# Patient Record
Sex: MALE | Race: WHITE
[De-identification: names, ages, dates, MRNs, and addresses within clinical notes are randomized per-mention and may not be internally consistent; named-entity substitution may affect disease eponyms.]

---

## 2017-04-18 ENCOUNTER — HOSPITAL ENCOUNTER (OUTPATIENT)
Dept: HOSPITAL 17 - HROP | Age: 52
Discharge: HOME | End: 2017-04-18
Payer: COMMERCIAL

## 2017-04-18 VITALS — WEIGHT: 144.4 LBS | HEIGHT: 70 IN | BODY MASS INDEX: 20.67 KG/M2

## 2017-04-18 VITALS
HEART RATE: 65 BPM | SYSTOLIC BLOOD PRESSURE: 154 MMHG | RESPIRATION RATE: 20 BRPM | TEMPERATURE: 97.5 F | DIASTOLIC BLOOD PRESSURE: 80 MMHG | OXYGEN SATURATION: 98 %

## 2017-04-18 DIAGNOSIS — I10: ICD-10-CM

## 2017-04-18 DIAGNOSIS — C25.9: Primary | ICD-10-CM

## 2017-04-18 LAB
APTT BLD: 33.1 SEC (ref 24.3–30.1)
INR PPP: 1.1 RATIO
PROTHROMBIN TIME: 12.4 SEC (ref 9.8–11.6)

## 2017-04-18 PROCEDURE — G0463 HOSPITAL OUTPT CLINIC VISIT: HCPCS

## 2017-04-18 PROCEDURE — 85730 THROMBOPLASTIN TIME PARTIAL: CPT

## 2017-04-18 PROCEDURE — 99211 OFF/OP EST MAY X REQ PHY/QHP: CPT

## 2017-04-18 PROCEDURE — 85610 PROTHROMBIN TIME: CPT

## 2017-12-18 ENCOUNTER — HOSPITAL ENCOUNTER (OUTPATIENT)
Dept: HOSPITAL 17 - HRAD | Age: 52
Discharge: HOME | End: 2017-12-18
Attending: INTERNAL MEDICINE
Payer: COMMERCIAL

## 2017-12-18 VITALS
HEART RATE: 60 BPM | OXYGEN SATURATION: 99 % | DIASTOLIC BLOOD PRESSURE: 82 MMHG | TEMPERATURE: 97.8 F | SYSTOLIC BLOOD PRESSURE: 144 MMHG | RESPIRATION RATE: 18 BRPM

## 2017-12-18 VITALS
HEART RATE: 69 BPM | TEMPERATURE: 97.7 F | RESPIRATION RATE: 14 BRPM | SYSTOLIC BLOOD PRESSURE: 180 MMHG | OXYGEN SATURATION: 98 % | DIASTOLIC BLOOD PRESSURE: 98 MMHG

## 2017-12-18 VITALS
OXYGEN SATURATION: 99 % | HEART RATE: 64 BPM | DIASTOLIC BLOOD PRESSURE: 82 MMHG | RESPIRATION RATE: 17 BRPM | SYSTOLIC BLOOD PRESSURE: 156 MMHG

## 2017-12-18 DIAGNOSIS — R18.8: Primary | ICD-10-CM

## 2017-12-18 PROCEDURE — 49083 ABD PARACENTESIS W/IMAGING: CPT

## 2017-12-18 PROCEDURE — C1729 CATH, DRAINAGE: HCPCS

## 2018-01-02 ENCOUNTER — HOSPITAL ENCOUNTER (INPATIENT)
Dept: HOSPITAL 17 - NEPC | Age: 53
LOS: 4 days | Discharge: HOSPICE | DRG: 442 | End: 2018-01-06
Attending: HOSPITALIST | Admitting: HOSPITALIST
Payer: MEDICAID

## 2018-01-02 VITALS
DIASTOLIC BLOOD PRESSURE: 92 MMHG | TEMPERATURE: 97.7 F | OXYGEN SATURATION: 95 % | HEART RATE: 102 BPM | RESPIRATION RATE: 18 BRPM | SYSTOLIC BLOOD PRESSURE: 171 MMHG

## 2018-01-02 VITALS
DIASTOLIC BLOOD PRESSURE: 98 MMHG | RESPIRATION RATE: 24 BRPM | OXYGEN SATURATION: 99 % | SYSTOLIC BLOOD PRESSURE: 182 MMHG | HEART RATE: 108 BPM

## 2018-01-02 VITALS — WEIGHT: 145.51 LBS | HEIGHT: 68 IN | BODY MASS INDEX: 22.05 KG/M2

## 2018-01-02 VITALS — HEART RATE: 103 BPM

## 2018-01-02 VITALS — SYSTOLIC BLOOD PRESSURE: 175 MMHG | DIASTOLIC BLOOD PRESSURE: 90 MMHG

## 2018-01-02 VITALS — HEART RATE: 106 BPM | RESPIRATION RATE: 22 BRPM | OXYGEN SATURATION: 98 %

## 2018-01-02 VITALS
HEART RATE: 124 BPM | SYSTOLIC BLOOD PRESSURE: 179 MMHG | OXYGEN SATURATION: 95 % | TEMPERATURE: 97.9 F | DIASTOLIC BLOOD PRESSURE: 100 MMHG | RESPIRATION RATE: 18 BRPM

## 2018-01-02 VITALS
OXYGEN SATURATION: 94 % | HEART RATE: 108 BPM | DIASTOLIC BLOOD PRESSURE: 83 MMHG | SYSTOLIC BLOOD PRESSURE: 148 MMHG | TEMPERATURE: 96 F | RESPIRATION RATE: 14 BRPM

## 2018-01-02 VITALS
SYSTOLIC BLOOD PRESSURE: 175 MMHG | OXYGEN SATURATION: 97 % | HEART RATE: 109 BPM | RESPIRATION RATE: 24 BRPM | DIASTOLIC BLOOD PRESSURE: 88 MMHG

## 2018-01-02 VITALS
HEART RATE: 109 BPM | RESPIRATION RATE: 18 BRPM | TEMPERATURE: 96.4 F | SYSTOLIC BLOOD PRESSURE: 171 MMHG | DIASTOLIC BLOOD PRESSURE: 93 MMHG | OXYGEN SATURATION: 97 %

## 2018-01-02 VITALS
DIASTOLIC BLOOD PRESSURE: 92 MMHG | TEMPERATURE: 97.4 F | SYSTOLIC BLOOD PRESSURE: 179 MMHG | OXYGEN SATURATION: 95 % | HEART RATE: 111 BPM | RESPIRATION RATE: 18 BRPM

## 2018-01-02 VITALS
HEART RATE: 103 BPM | RESPIRATION RATE: 16 BRPM | SYSTOLIC BLOOD PRESSURE: 151 MMHG | TEMPERATURE: 97.1 F | DIASTOLIC BLOOD PRESSURE: 72 MMHG | OXYGEN SATURATION: 95 %

## 2018-01-02 VITALS
DIASTOLIC BLOOD PRESSURE: 95 MMHG | OXYGEN SATURATION: 96 % | SYSTOLIC BLOOD PRESSURE: 179 MMHG | RESPIRATION RATE: 13 BRPM | HEART RATE: 99 BPM

## 2018-01-02 VITALS
OXYGEN SATURATION: 96 % | RESPIRATION RATE: 20 BRPM | DIASTOLIC BLOOD PRESSURE: 77 MMHG | HEART RATE: 96 BPM | SYSTOLIC BLOOD PRESSURE: 151 MMHG

## 2018-01-02 VITALS — HEART RATE: 108 BPM

## 2018-01-02 DIAGNOSIS — G89.29: ICD-10-CM

## 2018-01-02 DIAGNOSIS — E87.2: ICD-10-CM

## 2018-01-02 DIAGNOSIS — K74.60: ICD-10-CM

## 2018-01-02 DIAGNOSIS — C79.51: ICD-10-CM

## 2018-01-02 DIAGNOSIS — R18.8: ICD-10-CM

## 2018-01-02 DIAGNOSIS — C25.9: ICD-10-CM

## 2018-01-02 DIAGNOSIS — Z66: ICD-10-CM

## 2018-01-02 DIAGNOSIS — R78.81: ICD-10-CM

## 2018-01-02 DIAGNOSIS — Z87.891: ICD-10-CM

## 2018-01-02 DIAGNOSIS — I10: ICD-10-CM

## 2018-01-02 DIAGNOSIS — E46: ICD-10-CM

## 2018-01-02 DIAGNOSIS — R00.0: ICD-10-CM

## 2018-01-02 DIAGNOSIS — C78.7: ICD-10-CM

## 2018-01-02 DIAGNOSIS — D68.4: ICD-10-CM

## 2018-01-02 DIAGNOSIS — J44.9: ICD-10-CM

## 2018-01-02 DIAGNOSIS — K72.90: Primary | ICD-10-CM

## 2018-01-02 DIAGNOSIS — R64: ICD-10-CM

## 2018-01-02 LAB
ALBUMIN SERPL-MCNC: 2.1 GM/DL (ref 3.4–5)
ALP SERPL-CCNC: 184 U/L (ref 45–117)
ALT SERPL-CCNC: 47 U/L (ref 12–78)
AMORPHOUS SEDIMENT, URINE: (no result)
AST SERPL-CCNC: 91 U/L (ref 15–37)
BACTERIA #/AREA URNS HPF: (no result) /HPF
BASO STIPL BLD QL SMEAR: (no result)
BASOPHILS # BLD AUTO: 0 TH/MM3 (ref 0–0.2)
BASOPHILS NFR BLD: 0.3 % (ref 0–2)
BILIRUB INDIRECT SERPL-MCNC: 6.2 MG/DL (ref 0–0.8)
BILIRUB SERPL-MCNC: 20.4 MG/DL (ref 0.2–1)
BILIRUB SERPL-MCNC: 21.5 MG/DL (ref 0.2–1)
BUN SERPL-MCNC: 37 MG/DL (ref 7–18)
CALCIUM SERPL-MCNC: 8 MG/DL (ref 8.5–10.1)
CHLORIDE SERPL-SCNC: 99 MEQ/L (ref 98–107)
COLOR UR: (no result)
CREAT SERPL-MCNC: 1.23 MG/DL (ref 0.6–1.3)
DIRECT BILIRUBIN ADULT: 14.2 MG/DL (ref 0–0.2)
EOSINOPHIL # BLD: 0 TH/MM3 (ref 0–0.4)
EOSINOPHIL NFR BLD: 0.6 % (ref 0–4)
ERYTHROCYTE [DISTWIDTH] IN BLOOD BY AUTOMATED COUNT: 17.4 % (ref 11.6–17.2)
ERYTHROCYTE [DISTWIDTH] IN BLOOD BY AUTOMATED COUNT: 17.8 % (ref 11.6–17.2)
GFR SERPLBLD BASED ON 1.73 SQ M-ARVRAT: 62 ML/MIN (ref 89–?)
GLUCOSE SERPL-MCNC: 101 MG/DL (ref 74–106)
GLUCOSE UR STRIP-MCNC: (no result) MG/DL
HCO3 BLD-SCNC: 25.9 MEQ/L (ref 21–32)
HCT VFR BLD CALC: 35.8 % (ref 39–51)
HCT VFR BLD CALC: 36.7 % (ref 39–51)
HGB BLD-MCNC: 12.9 GM/DL (ref 13–17)
HGB BLD-MCNC: 13.1 GM/DL (ref 13–17)
HGB UR QL STRIP: (no result)
HYALINE CASTS #/AREA URNS LPF: 6 /LPF
INR PPP: 3.9 RATIO
INR PPP: 4.2 RATIO
KETONES UR STRIP-MCNC: 10 MG/DL
LACTIC ACID SEPSIS PROTOCOL: 3.8 MMOL/L (ref 0.4–2)
LDH SERPL-CCNC: 271 U/L (ref 87–241)
LIPASE: 46 U/L (ref 73–393)
LYMPHOCYTES # BLD AUTO: 0.9 TH/MM3 (ref 1–4.8)
LYMPHOCYTES NFR BLD AUTO: 11.7 % (ref 9–44)
LYMPHOCYTES: 12 % (ref 9–44)
MCH RBC QN AUTO: 34.4 PG (ref 27–34)
MCH RBC QN AUTO: 34.6 PG (ref 27–34)
MCHC RBC AUTO-ENTMCNC: 35.6 % (ref 32–36)
MCHC RBC AUTO-ENTMCNC: 36 % (ref 32–36)
MCV RBC AUTO: 96 FL (ref 80–100)
MCV RBC AUTO: 96.7 FL (ref 80–100)
MONOCYTE #: 0.6 TH/MM3 (ref 0–0.9)
MONOCYTES NFR BLD: 7.1 % (ref 0–8)
MONOCYTES: 3 % (ref 0–8)
MUCOUS THREADS #/AREA URNS LPF: (no result) /LPF
NEUTROPHILS # BLD AUTO: 6.2 TH/MM3 (ref 1.8–7.7)
NEUTROPHILS NFR BLD AUTO: 80.3 % (ref 16–70)
NEUTS BAND # BLD MANUAL: 6.6 TH/MM3 (ref 1.8–7.7)
NEUTS BAND NFR BLD: 18 % (ref 0–6)
NEUTS SEG NFR BLD MANUAL: 66 % (ref 16–70)
NITRITE UR QL STRIP: (no result)
PLATELET # BLD: 140 TH/MM3 (ref 150–450)
PLATELET # BLD: 83 TH/MM3 (ref 150–450)
PMV BLD AUTO: 7.7 FL (ref 7–11)
PMV BLD AUTO: 8.2 FL (ref 7–11)
PROT SERPL-MCNC: 7.1 GM/DL (ref 6.4–8.2)
PROT SERPL-MCNC: 7.4 GM/DL (ref 6.4–8.2)
PROTHROMBIN TIME: 39.5 SEC (ref 9.8–11.6)
PROTHROMBIN TIME: 42.2 SEC (ref 9.8–11.6)
RBC # BLD AUTO: 3.73 MIL/MM3 (ref 4.5–5.9)
RBC # BLD AUTO: 3.8 MIL/MM3 (ref 4.5–5.9)
SODIUM SERPL-SCNC: 134 MEQ/L (ref 136–145)
SP GR UR STRIP: 1.02 (ref 1–1.03)
URINE LEUKOCYTE ESTERASE: (no result)
WBC # BLD AUTO: 12.4 TH/MM3 (ref 4–11)
WBC # BLD AUTO: 7.8 TH/MM3 (ref 4–11)
WBC TOXIC VACUOLES BLD QL SMEAR: PRESENT

## 2018-01-02 PROCEDURE — 80048 BASIC METABOLIC PNL TOTAL CA: CPT

## 2018-01-02 PROCEDURE — 85007 BL SMEAR W/DIFF WBC COUNT: CPT

## 2018-01-02 PROCEDURE — 96365 THER/PROPH/DIAG IV INF INIT: CPT

## 2018-01-02 PROCEDURE — 84155 ASSAY OF PROTEIN SERUM: CPT

## 2018-01-02 PROCEDURE — 87186 SC STD MICRODIL/AGAR DIL: CPT

## 2018-01-02 PROCEDURE — 70450 CT HEAD/BRAIN W/O DYE: CPT

## 2018-01-02 PROCEDURE — 96368 THER/DIAG CONCURRENT INF: CPT

## 2018-01-02 PROCEDURE — 82948 REAGENT STRIP/BLOOD GLUCOSE: CPT

## 2018-01-02 PROCEDURE — 83690 ASSAY OF LIPASE: CPT

## 2018-01-02 PROCEDURE — 81001 URINALYSIS AUTO W/SCOPE: CPT

## 2018-01-02 PROCEDURE — 80202 ASSAY OF VANCOMYCIN: CPT

## 2018-01-02 PROCEDURE — 87077 CULTURE AEROBIC IDENTIFY: CPT

## 2018-01-02 PROCEDURE — 84100 ASSAY OF PHOSPHORUS: CPT

## 2018-01-02 PROCEDURE — 83615 LACTATE (LD) (LDH) ENZYME: CPT

## 2018-01-02 PROCEDURE — 84439 ASSAY OF FREE THYROXINE: CPT

## 2018-01-02 PROCEDURE — 85335 FACTOR INHIBITOR TEST: CPT

## 2018-01-02 PROCEDURE — 85610 PROTHROMBIN TIME: CPT

## 2018-01-02 PROCEDURE — 74177 CT ABD & PELVIS W/CONTRAST: CPT

## 2018-01-02 PROCEDURE — C9113 INJ PANTOPRAZOLE SODIUM, VIA: HCPCS

## 2018-01-02 PROCEDURE — 83735 ASSAY OF MAGNESIUM: CPT

## 2018-01-02 PROCEDURE — 87205 SMEAR GRAM STAIN: CPT

## 2018-01-02 PROCEDURE — 82150 ASSAY OF AMYLASE: CPT

## 2018-01-02 PROCEDURE — 80076 HEPATIC FUNCTION PANEL: CPT

## 2018-01-02 PROCEDURE — 85027 COMPLETE CBC AUTOMATED: CPT

## 2018-01-02 PROCEDURE — 83036 HEMOGLOBIN GLYCOSYLATED A1C: CPT

## 2018-01-02 PROCEDURE — 82247 BILIRUBIN TOTAL: CPT

## 2018-01-02 PROCEDURE — 80053 COMPREHEN METABOLIC PANEL: CPT

## 2018-01-02 PROCEDURE — 82140 ASSAY OF AMMONIA: CPT

## 2018-01-02 PROCEDURE — 82248 BILIRUBIN DIRECT: CPT

## 2018-01-02 PROCEDURE — 76705 ECHO EXAM OF ABDOMEN: CPT

## 2018-01-02 PROCEDURE — 85025 COMPLETE CBC W/AUTO DIFF WBC: CPT

## 2018-01-02 PROCEDURE — 83605 ASSAY OF LACTIC ACID: CPT

## 2018-01-02 PROCEDURE — 71045 X-RAY EXAM CHEST 1 VIEW: CPT

## 2018-01-02 PROCEDURE — 85730 THROMBOPLASTIN TIME PARTIAL: CPT

## 2018-01-02 PROCEDURE — 84443 ASSAY THYROID STIM HORMONE: CPT

## 2018-01-02 PROCEDURE — 87040 BLOOD CULTURE FOR BACTERIA: CPT

## 2018-01-02 RX ADMIN — WATER SCH ML: 1 IRRIGANT IRRIGATION at 21:17

## 2018-01-02 RX ADMIN — TAZOBACTAM SODIUM AND PIPERACILLIN SODIUM SCH MLS/HR: 375; 3 INJECTION, SOLUTION INTRAVENOUS at 23:49

## 2018-01-02 RX ADMIN — PANTOPRAZOLE SODIUM SCH MG: 40 INJECTION, POWDER, FOR SOLUTION INTRAVENOUS at 16:36

## 2018-01-02 RX ADMIN — SODIUM BICARBONATE SCH MLS/HR: 84 INJECTION, SOLUTION INTRAVENOUS at 21:25

## 2018-01-02 RX ADMIN — Medication SCH ML: at 08:19

## 2018-01-02 RX ADMIN — SODIUM CHLORIDE SCH MLS/HR: 900 INJECTION INTRAVENOUS at 15:19

## 2018-01-02 RX ADMIN — STANDARDIZED SENNA CONCENTRATE AND DOCUSATE SODIUM SCH TAB: 8.6; 5 TABLET, FILM COATED ORAL at 21:00

## 2018-01-02 RX ADMIN — WATER SCH ML: 1 IRRIGANT IRRIGATION at 18:14

## 2018-01-02 RX ADMIN — RIFAXIMIN SCH MG: 550 TABLET ORAL at 09:51

## 2018-01-02 RX ADMIN — TAZOBACTAM SODIUM AND PIPERACILLIN SODIUM SCH MLS/HR: 375; 3 INJECTION, SOLUTION INTRAVENOUS at 11:25

## 2018-01-02 RX ADMIN — WATER SCH ML: 1 IRRIGANT IRRIGATION at 09:50

## 2018-01-02 RX ADMIN — TAZOBACTAM SODIUM AND PIPERACILLIN SODIUM SCH MLS/HR: 375; 3 INJECTION, SOLUTION INTRAVENOUS at 16:36

## 2018-01-02 RX ADMIN — Medication SCH ML: at 19:47

## 2018-01-02 RX ADMIN — WATER SCH ML: 1 IRRIGANT IRRIGATION at 14:00

## 2018-01-02 RX ADMIN — STANDARDIZED SENNA CONCENTRATE AND DOCUSATE SODIUM SCH TAB: 8.6; 5 TABLET, FILM COATED ORAL at 09:00

## 2018-01-02 RX ADMIN — RIFAXIMIN SCH MG: 550 TABLET ORAL at 21:17

## 2018-01-02 NOTE — HHI.HP
__________________________________________________





HPI


Service


[facility]


Primary Care Physician


No Primary Care Physician


Admission Diagnosis





jaundice.  Hepatic encephalopathy.  History of pancreatic cancer.


Diagnoses:  


Travel History


International Travel<30 Days:  No


Contact w/Intl Traveler <30 Da:  No


Traveled to Known Affected Are:  No


History of Present Illness


52-year-old male with a past medical history significant for pancreatic cancer 

and hypertension presents to the emergency department with altered mental 

status that began yesterday.  The patient's wife reports that he has had 

intermittent coughing and vomiting for the past 2 days.  She reports that 

yesterday he became altered describing him as confused and disoriented.  The 

patient did not know where he was when he was in his own home.  She also 

reports noticing increased scleral icterus and jaundice that began on .  

Vision is currently undergoing chemotherapy.  His oncologist is Dr. Alves.


On arrival to the emergency department the patient was tachycardic at 124.  He 

was afebrile at 97.9.  /100.  Pulse ox 95% on room air.  Lab values 

significant for a total bilirubin of 21.5 (was 1.8 on ).  Ammonia 91.  

Lactic acid 6.3.  CBC pending.  Head CT negative for acute intracranial 

process.  The patient is altered, A&O 0.





Review of Systems


Unable to obtain secondary to patient's clinical condition





Past Family Social History


Past Medical History


Hypertension


Pancreatic cancer diagnosed 15


Past Surgical History


Vasectomy


Port placement in 


CT-guided biopsy in 


Percutaneous drain in 


Reported Medications





Reported Meds & Active Scripts


Active


Reported


Amlodipine (Amlodipine Besylate) 5 Mg Tab 5 Mg PO DAILY


Allergies:  


Coded Allergies:  


     No Known Allergies (Unverified , 16)


Family History


Negative for CAD/DM


Social History


Per wife, the patient does not drink alcohol, smoke cigarettes or take illicit 

drugs





Physical Exam


Vital Signs





Vital Signs








  Date Time  Temp Pulse Resp B/P (MAP) Pulse Ox O2 Delivery O2 Flow Rate FiO2


 


18 04:35  96 20 151/77 (101) 96 Room Air  


 


18 03:38  109 24 175/88 (117) 97 Room Air  


 


18 03:27  106 22  98 Room Air  


 


18 02:55  108 24 182/98 (126) 99 Room Air  


 


18 02:38 97.9 124 18 179/100 (126) 95 Room Air  








Physical Exam


GENERAL: Cachectic  male lying in bed


SKIN: Jaundice, + scleral icterus


HEAD: Atraumatic. Normocephalic. No temporal or scalp tenderness.


EYES: Pupils equal round and reactive. Extraocular motions intact. 


ENT: Nose without bleeding, purulent drainage or septal hematoma. Throat 

without erythema, tonsillar hypertrophy or exudate. Uvula midline. Airway 

patent.


NECK: Trachea midline. No JVD or lymphadenopathy. 


CARDIOVASCULAR: Regular rate and rhythm without murmurs, gallops, or rubs. 


RESPIRATORY: Clear to auscultation. Breath sounds equal bilaterally. No wheezes

, rales, or rhonchi.  


GASTROINTESTINAL: Abdomen soft, non-tender, + ascites. No guarding.


MUSCULOSKELETAL: 2+ pitting edema to the midshin


NEUROLOGICAL: Patient babbling incoherently.  Unable to answer questions.  

Moves all 4 extremities spontaneously.


Laboratory





Laboratory Tests








Test


  18


03:15 18


05:15


 


Prothrombin Time 39.5  


 


Prothromb Time International


Ratio 3.9 


  


 


 


Activated Partial


Thromboplast Time 50.1 


  


 


 


Blood Urea Nitrogen 37  


 


Creatinine 1.23  


 


Random Glucose 101  


 


Total Protein 7.4  


 


Albumin 2.1  


 


Calcium Level 8.0  


 


Alkaline Phosphatase 184  


 


Aspartate Amino Transf


(AST/SGOT) 91 


  


 


 


Alanine Aminotransferase


(ALT/SGPT) 47 


  


 


 


Total Bilirubin 21.5  


 


Sodium Level 134  


 


Potassium Level 4.0  


 


Chloride Level 99  


 


Carbon Dioxide Level 25.9  


 


Anion Gap 9  


 


Estimat Glomerular Filtration


Rate 62 


  


 


 


Lactic Acid Level 6.3  


 


Ammonia 91  


 


Lipase 46  














 Date/Time


Source Procedure


Growth Status


 


 


 18 03:15


Blood Peripheral Aerobic Blood Culture


Pending Received


 


 18 03:15


Blood Peripheral Anaerobic Blood Culture


Pending Received








Result Diagram:  


18 0315








Caprini VTE Risk Assessment


Caprini VTE Risk Assessment:  Mod/High Risk (score >= 2)


Caprini Risk Assessment Model











 Point Value = 1          Point Value = 2  Point Value = 3  Point Value = 5


 


Age 41-60


Minor surgery


BMI > 25 kg/m2


Swollen legs


Varicose veins


Pregnancy or postpartum


History of unexplained or recurrent


   spontaneous 


Oral contraceptives or hormone


   replacement


Sepsis (< 1 month)


Serious lung disease, including


   pneumonia (< 1 month)


Abnormal pulmonary function


Acute myocardial infarction


Congestive heart failure (< 1 month)


History of inflammatory bowel disease


Medical patient at bed rest Age 61-74


Arthroscopic surgery


Major open surgery (> 45 min)


Laparoscopic surgery (> 45 min)


Malignancy


Confined to bed (> 72 hours)


Immobilizing plaster cast


Central venous access Age >= 75


History of VTE


Family history of VTE


Factor V Leiden


Prothrombin 33998Z


Lupus anticoagulant


Anticardiolipin antibodies


Elevated serum homocysteine


Heparin-induced thrombocytopenia


Other congenital or acquired


   thrombophilia Stroke (< 1 month)


Elective arthroplasty


Hip, pelvis, or leg fracture


Acute spinal cord injury (< 1 month)








Prophylaxis Regimen











   Total Risk


Factor Score Risk Level Prophylaxis Regimen


 


0-1      Low Early ambulation


 


2 Moderate Order ONE of the following:


*Sequential Compression Device (SCD)


*Heparin 5000 units SQ BID


 


3-4 Higher Order ONE of the following medications:


*Heparin 5000 units SQ TID


*Enoxaparin/Lovenox 40 mg SQ daily (WT < 150 kg, CrCl > 30 mL/min)


*Enoxaparin/Lovenox 30 mg SQ daily (WT < 150 kg, CrCl > 10-29 mL/min)


*Enoxaparin/Lovenox 30 mg SQ BID (WT < 150 kg, CrCl > 30 mL/min)


AND/OR


*Sequential Compression Device (SCD)


 


5 or more Highest Order ONE of the following medications:


*Heparin 5000 units SQ TID (Preferred with Epidurals)


*Enoxaparin/Lovenox 40 mg SQ daily (WT < 150 kg, CrCl > 30 mL/min)


*Enoxaparin/Lovenox 30 mg SQ daily (WT < 150 kg, CrCl > 10-29 mL/min)


*Enoxaparin/Lovenox 30 mg SQ BID (WT < 150 kg, CrCl > 30 mL/min)


AND


*Sequential Compression Device (SCD)











Assessment and Plan


Assessment and Plan


Assessment/plan:





1.  Pancreatic cancer/AMS/Jaundice/bilirubinemia/hepatic encephalopathy


T bili 21.5, ammonia 91, AST/ALT 91/47


Patient with history of pancreatic duct stent, concern for blockage.  

Ultrasound pending for further evaluation.


Consult gastroenterology, medical oncology - appreciate recommendations


Lactulose for elevated ammonia





2.  Elevated lactic acid


Suspect secondary to above


Chest x-ray without infiltrates


CBC pending


UA pending


Vancomycin/Zosyn until infectious rule out completed


Holding IV fluid hydration secondary to ascites and low albumin





3.  Ascites


Chronic


Last paracentesis on 


Continue Lasix





4.  Hypertension


Continue amlodipine





FEN


NPO


Electrolytes: monitor and replete prn


SCDs


Holding pharmacologic anticoagulation as patient may require procedure





Physician Certification


2 Midnight Certification Type:  Admission for Inpatient Services


Order for Inpatient Services


The services are ordered in accordance with Medicare regulations or non-

Medicare payer requirements, as applicable.  In the case of services not 

specified as inpatient-only, they are appropriately provided as inpatient 

services in accordance with the 2-midnight benchmark.


Estimated LOS (days):  2


2 days is the estimated time the patient will need to remain in the hospital, 

assuming treatment plan goals are met and no additional complications.


Post-Hospital Plan:  Not yet determined











Sharon Hernandez MD 2018 05:59

## 2018-01-02 NOTE — PD
HPI


Chief Complaint:  Altered Mental Status


Time Seen by Provider:  02:49


Travel History


International Travel<30 days:  No


Contact w/Intl Traveler<30days:  No


Traveled to known affect area:  No





History of Present Illness


HPI


52-year-old male was brought in by family member for mental confusion, 

increased generalized malaise and weakness, decrease in appetite, intermittent 

nausea vomiting for the past 2 days.  Patient has history of pancreatic cancer.

  Patient was on Folfirinox hemotherapy.  Patient was unable to tolerate  more 

chemotherapy due to cytopenia.  Chemotherapy was changed to Keytruda.  Patient 

had one cycle of the children 2 weeks ago.  Family member states the patient 

has increasing delirium, confusion, decreased appetite with nausea vomiting for 

the past 2 days.  Patient has history of chronic abdominal pain with ascites 

that the post paracentesis in the past.  Patient has history hypertension and 

was on amlodipine and past.  Patient has not taken his amlodipine since 

October.  Patient has been taking Lasix 20 mg as needed for swelling and 

ascites.  Patient had chronic abdominal pain with ascites.  Patient has history 

of COPD, metal stent placement biliary duct, hypertension.  Family member 

reported no fever at home.  Family members reported no coughing congestion.  

Family member reported increasing jaundice of the skin in the eyes for the past 

2 days.





PFSH


Past Medical History


Heart Rhythm Problems:  No


Cancer:  Yes (PANCREATIC )


Cardiovascular Problems:  Yes (HTN)


High Cholesterol:  No


Chemotherapy:  Yes


Chest Pain:  No


Congestive Heart Failure:  No


Endocrine:  No


Gastrointestinal Disorders:  No


Genitourinary:  No


Heparin Induced Thrombocytopen:  No


Hypertension:  Yes


Immune Disorder:  No


Implanted Vascular Access Dvce:  No


Musculoskeletal:  No


Neurologic:  No


Psychiatric:  No


Reproductive:  No


Respiratory:  No


Immunizations Current:  No


Radiation Therapy:  No


Sickle Cell Disease:  No





Past Surgical History


Thoracic Surgery:  Yes (POT PLACEMENT)


Other Surgery:  No





Social History


Alcohol Use:  No (6-12 beer/day for the past 30 years)


Tobacco Use:  No


Substance Use:  No





Allergies-Medications


(Allergen,Severity, Reaction):  


Coded Allergies:  


     No Known Allergies (Unverified , 8/17/16)


Reported Meds & Prescriptions





Reported Meds & Active Scripts


Active


Reported


Amlodipine (Amlodipine Besylate) 5 Mg Tab 5 Mg PO DAILY








Review of Systems


General / Constitutional:  No: Fever


Eyes:  No: Visual changes


HENT:  No: Headaches


Cardiovascular:  No: Chest Pain or Discomfort


Respiratory:  No: Shortness of Breath


Gastrointestinal:  Positive: Nausea, Vomiting, No: Abdominal Pain


Genitourinary:  No: Dysuria


Musculoskeletal:  No: Pain


Skin:  No Rash


Neurologic:  No: Weakness


Psychiatric:  No: Depression


Endocrine:  No: Polydipsia


Hematologic/Lymphatic:  No: Easy Bruising





Physical Exam


Narrative


GENERAL: Thin male, lying in bed, minimal vocalization, no acute distress.


SKIN: Focused skin assessment warm/dry.  The skin is jaundice.


HEAD: Normocephalic.


EYES: Bilateral scleral icterus. No injection or drainage. 


NECK: Supple, trachea midline. No JVD or lymphadenopathy.


CARDIOVASCULAR: Regular rate and rhythm without murmurs, gallops, or rubs. 


RESPIRATORY: Breath sounds equal bilaterally. No accessory muscle use.


GASTROINTESTINAL: Abdomen soft, non-tender, nondistended. 


MUSCULOSKELETAL: No cyanosis, or edema. 


BACK: Nontender without obvious deformity. No CVA tenderness. 


Neurologic exam: Patient's awake and alert however minimal globalization.  

Patient moves all extremity well.  No obvious focal neurological deficit.





Data


Data


Last Documented VS





Vital Signs








  Date Time  Temp Pulse Resp B/P (MAP) Pulse Ox O2 Delivery O2 Flow Rate FiO2


 


1/2/18 04:35  96 20 151/77 (101) 96 Room Air  


 


1/2/18 02:38 97.9       








Orders





 Orders


Complete Blood Count With Diff (1/2/18 03:01)


Comprehensive Metabolic Panel (1/2/18 03:01)


Prothrombin Time / Inr (Pt) (1/2/18 03:01)


Act Partial Throm Time (Ptt) (1/2/18 03:01)


Blood Culture (1/2/18 03:01)


Lipase (1/2/18 03:01)


Urinalysis - C+S If Indicated (1/2/18 03:01)


Ammonia (1/2/18 03:01)


Chest, Single Ap (1/2/18 03:01)


Ct Brain W/O Iv Contrast(Rout) (1/2/18 03:01)


Iv Access Insert/Monitor (1/2/18 03:01)


Ecg Monitoring (1/2/18 03:01)


Oximetry (1/2/18 03:01)


Lactic Acid (1/2/18 03:01)


Sodium Chlor 0.9% 1000 Ml Inj (Ns 1000 M (1/2/18 04:30)


Vancomycin Inj (Vancomycin Inj) (1/2/18 04:30)


Piperacil-Tazo 3.375 Gm Premix (Zosyn 3. (1/2/18 04:30)


Us Abdomen Gallbladder (1/2/18 04:56)





Labs





Laboratory Tests








Test


  1/2/18


03:15 1/2/18


04:20


 


Prothrombin Time 39.5 SEC  


 


Prothromb Time International


Ratio 3.9 RATIO 


  


 


 


Activated Partial


Thromboplast Time 50.1 SEC 


  


 


 


Blood Urea Nitrogen 37 MG/DL  


 


Creatinine 1.23 MG/DL  


 


Random Glucose 101 MG/DL  


 


Total Protein 7.4 GM/DL  


 


Albumin 2.1 GM/DL  


 


Calcium Level 8.0 MG/DL  


 


Alkaline Phosphatase 184 U/L  


 


Aspartate Amino Transf


(AST/SGOT) 91 U/L 


  


 


 


Alanine Aminotransferase


(ALT/SGPT) 47 U/L 


  


 


 


Total Bilirubin 21.5 MG/DL  


 


Sodium Level 134 MEQ/L  


 


Potassium Level 4.0 MEQ/L  


 


Chloride Level 99 MEQ/L  


 


Carbon Dioxide Level 25.9 MEQ/L  


 


Anion Gap 9 MEQ/L  


 


Estimat Glomerular Filtration


Rate 62 ML/MIN 


  


 


 


Lactic Acid Level 6.3 mmol/L  


 


Ammonia 91 MCMOL/L  


 


Lipase 46 U/L  











MDM


Medical Decision Making


Medical Screen Exam Complete:  Yes


Emergency Medical Condition:  Yes


Interpretation(s)





Last Impressions








Head CT 1/2/18 0301 Signed





Impressions: 





 Service Date/Time:  Tuesday, January 2, 2018 03:23 - CONCLUSION:  No acute 





 disease.       Clement Torres Jr., MD 


 


Chest X-Ray 1/2/18 0301 Signed





Impressions: 





 Service Date/Time:  Tuesday, January 2, 2018 03:16 - CONCLUSION:  Blunting of 





 the right costophrenic angle I either related to a small effusion or pleural 





 scarring. Otherwise, no infiltrates.     Clement Torres Jr., MD 





4:21 AM.  Lactic acid 6.3 .  Ammonia 91.  INR 3.9.


Differential Diagnosis


Differential diagnosis including acute exacerbation of chronic condition, 

hepatitis, pancreatitis, biliary obstruction, electrolyte imbalance, dehydration

, sepsis.


Narrative Course


52-year-old male with increasing jaundice, delirium, confusion, decreased 

appetite, nausea vomiting.  History of pancreatic cancer.  Patient is on 

chemotherapy.  Normal saline solution 1 L IV bolus.  Vancomycin 1 g IV.  Zosyn 

3.375 g IV.





Diagnosis





 Primary Impression:  


 Jaundice


 Additional Impression:  


 Hepatic encephalopathy





Admitting Information


Admitting Physician Requests:  Admit











Reg Coats MD Jan 2, 2018 03:13

## 2018-01-02 NOTE — MB
cc:

ALINA URÑEA STEVEN

****

 

 

DATE OF CONSULTATION:  1/2/2018

 

YOB: 1965

 

REFERRING PHYSICIAN

Dr. Baca

 

CHIEF COMPLAINT

Dr. Baca requested consultation for Mr. Chino regarding jaundice

associated with altered mental status.

 

HISTORY OF PRESENT ILLNESS

Mr. Chino is a 52-year-old man well-known patient with metastatic

unresectable pancreatic cancer.  He has progressed on multiple lines of

chemotherapy including Abraxane and gemcitabine.  Most recently he has had

progression on Folfirinox. His tumor markers are extremely high.  His last

cycle of chemotherapy was complicated by prolonged cytopenias unable to

tolerate more cytotoxic chemotherapy.

 

Since his progression, he has developed complications of ascites status post

thoracentesis without significant alleviation of symptoms.  He had lower

extremity edema.  He was on diuretic therapy.  Recently he was approved for

Keytruda, check point inhibitor.  He was treated with Keytruda prior to his

last visit on December 20, 2017.  He tolerated the Keytruda well.  We discussed

toxicity associated with the check point inhibitor.  He was supposed to follow

up after the holidays.

 

His wife reports that he was becoming jaundice two days prior to his

presentation.  Unfortunately he did not want to come into the emergency room.

Finally he was confused and his wife was able to bring him in.  He was seen in

the emergency room by Dr. Reg Coats.  He has confusion, generalized weakness.

He had a decrease in appetite.  He has had intermittent vomiting the last two

days which was new.

 

He is pending ERCP evaluation.  Gallbladder ultrasound shows diffuse ascites.

Liver is small consistent with cirrhosis.  He has known pancreatic cancer with

hepatic metastatic disease.  He has bony metastatic disease.  He has solid wall

stent in place.  His last bilirubin on his last followup was 1.8.  His

bilirubin on admission was 21.5.  He is overtly confused.  He is nonverbal. He

seems to have some recognition and follows simple commands.

 

LABORATORY DATA:

His labs show a hemoglobin of 12.9, platelet count 140,000.  His lactic acid is

elevated.  His ammonia is 91, .  The liver functions show an AST of 91,

alkaline phosphatase 184.  His last tumor marker was up to 24,000 after the

Keytruda.

 

PAST MEDICAL HISTORY

1. Unresectable pancreatic cancer.

2. Hypertension.

3. Ascites.

4. Liver cirrhosis.

5. Pancytopenia.

6. COPD.

 

PAST SURGICAL HISTORY:

1. Vasectomy

2. Port placement.

3. CT guided biopsy.

4. Percutaneous drain.

5. ERCP.

6. Metal stent placement.

 

FAMILY HISTORY:

No family history of cancer.

 

SOCIAL HISTORY

He is , lives with his wife.  Denies any tobacco, alcohol or illicit

drug use.  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

 

ALLERGIES

NO KNOWN DRUG ALLERGIES.

 

CURRENT MEDICATIONS

1. Clonidine p.r.n.

2. Vancomycin.

3. Protonix.

4. Piperacillin tazobactam.

5. Jackie-Colace

6. Lactulose.

7. Norvasc.

8. Cytoxan.

 

PHYSICAL EXAMINATION:

VITAL SIGNS: Temperature 96.4, heart rate 109, respiratory rate 18, blood

pressure 171/93, saturation 97%.

GENERAL: Mr. Chino is a cachectic appearing man who is awake, alert,

nonverbal, follows simple commands.

HEENT: His pupils are round and reactive.  Sclerae is icteric.  He is overtly

jaundiced, bitemporal wasting.  Oropharynx is dry, poor dentition.

NECK: Supple.

LUNGS: Clear to auscultation.

CARDIOVASCULAR: Reveals tachycardia.

ABDOMEN: Distension, fluid wave is noted.  Nontender.

LOWER EXTREMITIES: No edema.  He moves all four extremities spontaneously.

 

Labs consistent with a normocytic anemia.  Hemoglobin 12.9, platelet count 140.

White blood cell count is elevated.

 

ASSESSMENT AND PLAN:

Mr. Chino is a 52 year-old man with unresectable metastatic pancreatic cancer

who has progressed on multiple lines of chemotherapy.  He is currently on a

check point inhibitor.

 

He presents with acute jaundice.  The onset seems to have been about two days.

He has had a rising bilirubin since.  His metal stent is managed by a

gastroenterologist who do not admit to [facility].

 

He is pending consultation with gastroenterology.  He appears to have an

obstruction, hopefully on his metal stent.  We are obtaining MRCP to identify a

correctable lesion.

 

I defer to gastroenterology, if a correctable lesion is identified to correct

it.  We are hopeful for any reversible process that may decrease his bilirubin.

 

 

I discussed honestly with his wife if the increase in bilirubin is due to his

liver metastatic disease, the treatment has been palliative all along.  At this

point he is not able to make decisions.

 

Palliative care has been consulted and is talking with the family.  Mrs. Chino would like to remain hopeful for the time being.

 

We discussed supportive treatment including supportive fluid.  He is clinically

dry.  He is given lorazepam for his MRCP.  He is a fall precaution.  He may

need a one-to-one sitter pending on his progress overnight.  This was discussed

with the patient's nurses.

 

He is pending diagnostic paracentesis and therapeutic paracentesis.  Vitamin K

is administered.  He may need FFP in the morning to correct the PT/PTT

prolongation.  Mixing study has been requested.

 

 and Mrs. Chino's questions were answered to their satisfaction.  Mrs. Chino was called over the phone to discuss the above.  We will confer with

gastroenterology for reversible lesion.  In the meantime supportive treatment

lactulose is in place.

 

 

 

 

                              _________________________________

                              MD LUCA Abel/ESHA

D:  1/2/2018/7:04 PM

T:  1/2/2018/7:17 PM

Visit #:  [id number]

Job #:  [id number]

## 2018-01-02 NOTE — PD.CONS
HPI


History of Present Illness


This is a unfortunate 52 year old who was diagnosed with pancreatitis and has 

been followed by Dr. Ayala oncology.  According to the record patient has 

been receiving chemotherapy.  Currently patient is unable to give any pertinent 

history data, occasional groan and grimace noted, large taut abdomen noted with 

diffuse pain with light palpation.  Currently patient is resting in the bed, 

unknown for any nausea or vomiting, but positive for cathartic stature. .


According to the record patient became altered with increased jaundice and 

scleral and icterus on 17.  Initial bilirubin on admission was 21.5, 

ammonia level 91, lactic acid level 6.3.  INR 4.2, elevated in the last 24 

hours.  Patient is currently icteric skin, gallbladder ultrasound shows diffuse 

ascites liver cirrhosis with lesions, biliary stent in place.  He is in a very 

weakened state, and appears unable to communicate.  Most of his information is 

being obtained from the record





PFSH


Past Medical History


Hypertension


Pancreatic cancer diagnosed 16


Past Surgical History


Vasectomy


Port placement in 


CT-guided biopsy in 


Percutaneous drain in 


Coded Allergies:  


     No Known Allergies (Unverified , 16)


Medications





Administered Medications








 Medications


  (Trade)  Dose


 Ordered  Sig/Amilcar


 Route


 PRN Reason  Start Time


 Stop Time Status Last Admin


Dose Admin


 


 Sodium Chloride


  (NS Flush)  2 ml  BID


 IV FLUSH


   18 09:00


    18 08:19


 


 


 Lactulose


  (Lactulose Liq)  30 ml  QID


 PO


   18 09:00


    18 14:00


 


 


 Piperacillin Sod/


 Tazobactam Sod  50 ml @ 


 100 mls/hr  Q6H


 IV


   18 11:00


    18 11:25


 


 


 Amlodipine


 Besylate


  (Norvasc)  5 mg  DAILY


 PO


   18 09:00


    18 09:51


 


 


 Furosemide


  (Lasix)  20 mg  DAILY


 PO


   18 09:00


    18 09:50


 


 


 Rifaximin


  (Xifaxan)  550 mg  BID


 PO


   18 09:00


    18 09:51


 


 


 Vancomycin HCl


 1250 mg/Sodium


 Chloride  262.5 ml @ 


 250 mls/hr  Q18H


 IV


   18 16:00


    18 15:19


 








Last Impressions








Gall Bladder Ultrasound 18 0456 Signed





Impressions: 





 Service Date/Time:  2018 07:54 - CONCLUSION:  1. There is 





 diffuse abdominal ascites. 2. The liver appears quite small and heterogeneous 

in 





 echotexture consistent with cirrhosis. The patient has known pancreatic cancer 





 with several small hepatic lesions. These were not seen by the ultrasound. I 

do 





 not see evidence of intrahepatic biliary ductal dilation. The patient has a 





 known biliary stent in place. 3. There is minimal sludge in the dependent 





 portion the gallbladder. There is gallbladder wall thickening however, this is 





 not an unexpected finding with the presence of ascites.     Giorgi Soto MD 


 


Head CT 18 Signed





Impressions: 





 Service Date/Time:  2018 03:23 - CONCLUSION:  No acute 





 disease.       Clement Torres Jr., MD 


 


Chest X-Ray 18 Signed





Impressions: 





 Service Date/Time:  2018 03:16 - CONCLUSION:  Blunting of 





 the right costophrenic angle I either related to a small effusion or pleural 





 scarring. Otherwise, no infiltrates.     Clement Torres Jr., MD 








Family History


Father  in his 40s of heart attack, mother living in her 70s with Alzheimer'

s.


Social History


Per wife, the patient does not drink alcohol, smoke cigarettes or take illicit 

drugs





GI Exam


Vitals I&O





Vital Signs








  Date Time  Temp Pulse Resp B/P (MAP) Pulse Ox O2 Delivery O2 Flow Rate FiO2


 


18 15:15 96.4 109 18 171/93 (119) 97   


 


18 11:17 97.4 111 18 179/92 (121) 95   


 


18 11:00  108      


 


18 08:06 97.7 102 18 171/92 (118) 95   


 


18 06:29    175/90 (118)    


 


18 06:27  99 13 179/95 (123) 96   


 


18 04:35  96 20 151/77 (101) 96 Room Air  


 


18 03:38  109 24 175/88 (117) 97 Room Air  


 


18 03:27  106 22  98 Room Air  


 


18 02:55  108 24 182/98 (126) 99 Room Air  


 


18 02:38 97.9 124 18 179/100 (126) 95 Room Air  














I/O      


 


 18





 07:00 15:00 23:00 07:00 15:00 23:00


 


Intake Total     50 ml 


 


Balance     50 ml 


 


      


 


Intake IV Total     50 ml 








Imaging





Last Impressions








Gall Bladder Ultrasound 18 0456 Signed





Impressions: 





 Service Date/Time:  2018 07:54 - CONCLUSION:  1. There is 





 diffuse abdominal ascites. 2. The liver appears quite small and heterogeneous 

in 





 echotexture consistent with cirrhosis. The patient has known pancreatic cancer 





 with several small hepatic lesions. These were not seen by the ultrasound. I 

do 





 not see evidence of intrahepatic biliary ductal dilation. The patient has a 





 known biliary stent in place. 3. There is minimal sludge in the dependent 





 portion the gallbladder. There is gallbladder wall thickening however, this is 





 not an unexpected finding with the presence of ascites.     Giorgi Soto MD 


 


Head CT 18 0301 Signed





Impressions: 





 Service Date/Time:  2018 03:23 - CONCLUSION:  No acute 





 disease.       Clement Torres Jr., MD 


 


Chest X-Ray 18 Signed





Impressions: 





 Service Date/Time:  2018 03:16 - CONCLUSION:  Blunting of 





 the right costophrenic angle I either related to a small effusion or pleural 





 scarring. Otherwise, no infiltrates.     Clement Torres Jr., MD 








Laboratory











Test


  18


03:15 18


05:15 18


08:19 18


11:45


 


Prothrombin Time 39.5 SEC    


 


Prothromb Time International


Ratio 3.9 RATIO 


  


  


  


 


 


Activated Partial


Thromboplast Time 50.1 SEC 


  


  


  


 


 


Blood Urea Nitrogen 37 MG/DL    


 


Creatinine 1.23 MG/DL    


 


Random Glucose 101 MG/DL    


 


Total Protein 7.4 GM/DL    


 


Albumin 2.1 GM/DL    


 


Calcium Level 8.0 MG/DL    


 


Alkaline Phosphatase 184 U/L    


 


Aspartate Amino Transf


(AST/SGOT) 91 U/L 


  


  


  


 


 


Alanine Aminotransferase


(ALT/SGPT) 47 U/L 


  


  


  


 


 


Total Bilirubin 21.5 MG/DL    


 


Sodium Level 134 MEQ/L    


 


Potassium Level 4.0 MEQ/L    


 


Chloride Level 99 MEQ/L    


 


Carbon Dioxide Level 25.9 MEQ/L    


 


Anion Gap 9 MEQ/L    


 


Estimat Glomerular Filtration


Rate 62 ML/MIN 


  


  


  


 


 


Lactic Acid Level 6.3 mmol/L   3.8 mmol/L  5.2 mmol/L 


 


Ammonia 91 MCMOL/L    


 


Lipase 46 U/L    


 


White Blood Count  7.8 TH/MM3   


 


Red Blood Count  3.80 MIL/MM3   


 


Hemoglobin  13.1 GM/DL   


 


Hematocrit  36.7 %   


 


Mean Corpuscular Volume  96.7 FL   


 


Mean Corpuscular Hemoglobin  34.4 PG   


 


Mean Corpuscular Hemoglobin


Concent 


  35.6 % 


  


  


 


 


Red Cell Distribution Width  17.4 %   


 


Platelet Count  83 TH/MM3   


 


Mean Platelet Volume  7.7 FL   


 


Neutrophils (%) (Auto)  80.3 %   


 


Lymphocytes (%) (Auto)  11.7 %   


 


Monocytes (%) (Auto)  7.1 %   


 


Eosinophils (%) (Auto)  0.6 %   


 


Basophils (%) (Auto)  0.3 %   


 


Neutrophils # (Auto)  6.2 TH/MM3   


 


Lymphocytes # (Auto)  0.9 TH/MM3   


 


Monocytes # (Auto)  0.6 TH/MM3   


 


Eosinophils # (Auto)  0.0 TH/MM3   


 


Basophils # (Auto)  0.0 TH/MM3   


 


CBC Comment  AUTO DIFF   


 


Differential Total Cells


Counted 


  100 


  


  


 


 


Neutrophils % (Manual)  66 %   


 


Band Neutrophils %  18 %   


 


Lymphocytes %  12 %   


 


Monocytes %  3 %   


 


Eosinophils %  1 %   


 


Neutrophils # (Manual)  6.6 TH/MM3   


 


Differential Comment


  


  FINAL DIFF


MANUAL 


  


 


 


Atypical Lymphocytes   %   


 


Toxic Vacuolation  PRESENT   


 


Platelet Estimate  LOW   


 


Platelet Morphology Comment  NORMAL   


 


Basophilic Stippling  FAINT   


 


Test


  18


14:30 


  


  


 


 


White Blood Count 12.4 TH/MM3    


 


Red Blood Count 3.73 MIL/MM3    


 


Hemoglobin 12.9 GM/DL    


 


Hematocrit 35.8 %    


 


Mean Corpuscular Volume 96.0 FL    


 


Mean Corpuscular Hemoglobin 34.6 PG    


 


Mean Corpuscular Hemoglobin


Concent 36.0 % 


  


  


  


 


 


Red Cell Distribution Width 17.8 %    


 


Platelet Count 140 TH/MM3    


 


Mean Platelet Volume 8.2 FL    


 


Prothrombin Time 42.2 SEC    


 


Prothromb Time International


Ratio 4.2 RATIO 


  


  


  


 


 


Activated Partial


Thromboplast Time 49.9 SEC 


  


  


  


 


 


Lactate Dehydrogenase 271 U/L    


 


Total Protein 7.1 GM/DL    














 Date/Time


Source Procedure


Growth Status


 


 


 18 03:15


Blood Peripheral Aerobic Blood Culture


Pending Received


 


 18 03:15


Blood Peripheral Anaerobic Blood Culture


Pending Received








Physical Examination


HEENT: Pupils round and reactive to light; normocephalic; atraumatic; positive 

jaundice.  Poor oral, dental caries


NECK: Neck is supple, no JVD, no lymphadenopathy.


CHEST:  Chest is clear to auscultation and percussion.


CARDIAC:  Regular rate and rhythm with no murmur gallop or rubs.


ABDOMEN:  Soft, nondistended, nontender; no hepatosplenomegaly; bowel sounds 

are present in all four quadrants.


EXTREMITIES: No clubbing, cyanosis, or edema.


SKIN:  Thin, cathartic no rash; positive jaundice.


CNS:  Altered mental status, non-conversational





Assessment and Plan


Assessment:  


(1) Jaundice


ICD Codes:  R17 - Unspecified jaundice


Status:  Acute


Plan


Diet nothing by mouth for now, patient is not safe to trial any food or liquids 

now due to his altered mental status


Monitor for any acute bleeding, very high risk with elevated INR


according to the record ultrasound guided paracentesis per IR has been ordered


MRCP without contrast after paracentesis if patient is clinically stable, 

patient has biliary stent, evaluate for any possible obstruction


Will reevaluate patient after paracentesis completed, any further procedures TBA


Labs to monitor which includes LFTs, alkaline phosphatase and bilirubin


Palliative care is involved, which is an appropriate recommendation for this 

unfortunate gentleman


PPI IV





Plan of care will be based on symptom management and toleration of any further 

testing.





Patient was examined by myself and Dr. Marcos, note was done on his behalf











Aliya Zheng 2018 15:39

## 2018-01-02 NOTE — RADRPT
EXAM DATE/TIME:  01/02/2018 03:23 

 

[facility] COMPARISON:     

No previous studies available for comparison.

 

 

INDICATIONS :     

Altered mental status.

                      

 

RADIATION DOSE:     

33.02 CTDIvol (mGy) 

 

 

 

MEDICAL HISTORY :     

Cerebrovascular disease. Carcinoma, pancreas.  Hypertension.

 

SURGICAL HISTORY :      

None. 

 

ENCOUNTER:      

Initial

 

ACUITY:      

1 day

 

PAIN SCALE:      

0/10

 

LOCATION:        

cranial 

 

TECHNIQUE:     

Multiple contiguous axial images were obtained of the head.  Using automated exposure control and adj
ustment of the mA and/or kV according to patient size, radiation dose was kept as low as reasonably a
chievable to obtain optimal diagnostic quality images.   DICOM format image data is available electro
nically for review and comparison.  

 

FINDINGS:     

 

CEREBRUM:     

The ventricles are normal for age.  No evidence of midline shift, mass lesion, hemorrhage or acute in
farction.  No extra-axial fluid collections are seen.

 

POSTERIOR FOSSA:     

The cerebellum and brainstem are intact.  The 4th ventricle is midline.  The cerebellopontine angle i
s unremarkable.

 

EXTRACRANIAL:     

The visualized portion of the orbits is intact.

 

SKULL:     

The calvaria is intact.  No evidence of skull fracture.

 

CONCLUSION:     

No acute disease.  

 

 

 

 Clement Torres Jr., MD on January 02, 2018 at 3:55           

Board Certified Radiologist.

 This report was verified electronically.

## 2018-01-02 NOTE — RADRPT
EXAM DATE/TIME:  01/02/2018 07:54 

 

[facility] COMPARISON:     

CT ABDOMEN & PELVIS W/O CONTRAST, November 10, 2017, 14:16.  US GUIDED ABD PARACENTESIS, December 18, 2017, 10:23.

        

 

 

INDICATIONS :     

Right upper quadrant pain. 

                     

 

MEDICAL HISTORY :     

Carcinoma, pancreas.  Hypertension.   

 

SURGICAL HISTORY :          

Port placement. Biliary duct stent placement. Chemotherapy. 

 

ENCOUNTER:     

Initial

 

ACUITY:     

1 day

 

PAIN SCORE:     

7/10

 

LOCATION:     

Right upper quadrant 

                     

MEASUREMENTS:     

 

LIVER:     

16.7 cm length 

                 

 

FINDINGS: 

The examination was limited due to the patient being in abdominal pain.     

 

LIVER:     

The liver is heterogeneous and quite small in size consistent with cirrhosis. There is ascites diffus
ely throughout the upper abdomen. The patient has a known 1.9 cm lesion within the liver. This is not
 effectively evaluated by ultrasound.

 

COMMON DUCT:     

No intraluminal mass or stone visualized.  

 

GALLBLADDER:          

There is minimal sludge in the dependent portion the gallbladder. No stones are seen. There is some g
allbladder wall thickening however, this is not an unexpected finding in a trace of ascites.

 

PANCREAS:          

The patient has known pancreatic carcinoma. The pancreas was not well-visualized

 

RIGHT KIDNEY:          

No evidence of hydronephrosis, stone, or mass.  

 

CONCLUSION:     

1. There is diffuse abdominal ascites.

2. The liver appears quite small and heterogeneous in echotexture consistent with cirrhosis. The za
ent has known pancreatic cancer with several small hepatic lesions. These were not seen by the ultras
ound. I do not see evidence of intrahepatic biliary ductal dilation. The patient has a known biliary 
stent in place.

3. There is minimal sludge in the dependent portion the gallbladder. There is gallbladder wall thicke
jonathan however, this is not an unexpected finding with the presence of ascites.

 

 

 

 Giorgi Soto MD on January 02, 2018 at 8:26           

Board Certified Radiologist.

 This report was verified electronically.

## 2018-01-02 NOTE — RADRPT
EXAM DATE/TIME:  01/02/2018 03:16 

 

[facility] COMPARISON:     

No previous studies available for comparison.

 

                     

INDICATIONS :     

Short of breath. 

                     

 

MEDICAL HISTORY :            

Carcinoma, pancreas. Carcinoma, bone. Carinoma, liver. HTN. Ascites.   

 

SURGICAL HISTORY :        

Port placement. Biliary duct stent placement. Percutaneous drain.

 

ENCOUNTER:     

Initial                                        

 

ACUITY:     

1 day      

 

PAIN SCORE:     

Non-responsive.

 

LOCATION:     

Bilateral chest 

 

FINDINGS:     

A single view of the chest demonstrates the lungs to be symmetrically aerated without evidence of mas
s, infiltrate or effusion. Blunting of the right costophrenic angle.  The cardiomediastinal contours 
are unremarkable.  Osseous structures are intact.

 

CONCLUSION:     

Blunting of the right costophrenic angle I either related to a small effusion or pleural scarring. Ot
herwise, no infiltrates.

 

 

 

 Clement Torres Jr., MD on January 02, 2018 at 3:46           

Board Certified Radiologist.

 This report was verified electronically.

## 2018-01-02 NOTE — HHI.PR
Subjective


Remarks


52-year-old male with a past medical history significant for pancreatic cancer 

and hypertension presents to the emergency department with altered mental 

status that began yesterday.  The patient's wife reports that he has had 

intermittent coughing and vomiting for the past 2 days.  She reports that 

yesterday he became altered describing him as confused and disoriented.  The 

patient did not know where he was when he was in his own home.  She also 

reports noticing increased scleral icterus and jaundice that began on 12/30. 

PATIENT is currently undergoing chemotherapy.  His oncologist is Dr. Alves.


On arrival to the emergency department the patient was tachycardic at 124.  He 

was afebrile at 97.9.  /100.  Pulse ox 95% on room air.  Lab values 

significant for a total bilirubin of 21.5 (was 1.8 on 12/20).  Ammonia 91.  

Lactic acid 6.3.  CBC pending.  Head CT negative for acute intracranial 

process.  The patient is altered, A&O 0.





01-02 patient remains very confused.


Started on rifaximin.  Make sure he is on lactulose.


Get a.m. labs


Get physical therapy and occupational therapy to eval and treat


Await oncology evaluation


Consult palliative care


Severe coagulopathy with very poor prognosis


Much elevated bilirubin





Objective


Vitals





Vital Signs








  Date Time  Temp Pulse Resp B/P (MAP) Pulse Ox O2 Delivery O2 Flow Rate FiO2


 


1/2/18 08:06 97.7 102 18 171/92 (118) 95   


 


1/2/18 06:29    175/90 (118)    


 


1/2/18 06:27  99 13 179/95 (123) 96   


 


1/2/18 04:35  96 20 151/77 (101) 96 Room Air  


 


1/2/18 03:38  109 24 175/88 (117) 97 Room Air  


 


1/2/18 03:27  106 22  98 Room Air  


 


1/2/18 02:55  108 24 182/98 (126) 99 Room Air  


 


1/2/18 02:38 97.9 124 18 179/100 (126) 95 Room Air  








Result Diagram:  


1/2/18 0515                                                                    

            1/2/18 0315





Other Results





 Laboratory Tests








Test


  1/2/18


03:15 1/2/18


05:15 1/2/18


08:19


 


Prothrombin Time 39.5 SEC   


 


Prothromb Time International


Ratio 3.9 RATIO 


  


  


 


 


Activated Partial


Thromboplast Time 50.1 SEC 


  


  


 


 


Blood Urea Nitrogen 37 MG/DL   


 


Creatinine 1.23 MG/DL   


 


Random Glucose 101 MG/DL   


 


Total Protein 7.4 GM/DL   


 


Albumin 2.1 GM/DL   


 


Calcium Level 8.0 MG/DL   


 


Alkaline Phosphatase 184 U/L   


 


Aspartate Amino Transf


(AST/SGOT) 91 U/L 


  


  


 


 


Alanine Aminotransferase


(ALT/SGPT) 47 U/L 


  


  


 


 


Total Bilirubin 21.5 MG/DL   


 


Sodium Level 134 MEQ/L   


 


Potassium Level 4.0 MEQ/L   


 


Chloride Level 99 MEQ/L   


 


Carbon Dioxide Level 25.9 MEQ/L   


 


Anion Gap 9 MEQ/L   


 


Estimat Glomerular Filtration


Rate 62 ML/MIN 


  


  


 


 


Lactic Acid Level 6.3 mmol/L   3.8 mmol/L 


 


Ammonia 91 MCMOL/L   


 


Lipase 46 U/L   


 


White Blood Count  7.8 TH/MM3  


 


Red Blood Count  3.80 MIL/MM3  


 


Hemoglobin  13.1 GM/DL  


 


Hematocrit  36.7 %  


 


Mean Corpuscular Volume  96.7 FL  


 


Mean Corpuscular Hemoglobin  34.4 PG  


 


Mean Corpuscular Hemoglobin


Concent 


  35.6 % 


  


 


 


Red Cell Distribution Width  17.4 %  


 


Platelet Count  83 TH/MM3  


 


Mean Platelet Volume  7.7 FL  


 


Neutrophils (%) (Auto)  80.3 %  


 


Lymphocytes (%) (Auto)  11.7 %  


 


Monocytes (%) (Auto)  7.1 %  


 


Eosinophils (%) (Auto)  0.6 %  


 


Basophils (%) (Auto)  0.3 %  


 


Neutrophils # (Auto)  6.2 TH/MM3  


 


Lymphocytes # (Auto)  0.9 TH/MM3  


 


Monocytes # (Auto)  0.6 TH/MM3  


 


Eosinophils # (Auto)  0.0 TH/MM3  


 


Basophils # (Auto)  0.0 TH/MM3  


 


CBC Comment  AUTO DIFF  


 


Differential Total Cells


Counted 


  100 


  


 


 


Neutrophils % (Manual)  66 %  


 


Band Neutrophils %  18 %  


 


Lymphocytes %  12 %  


 


Monocytes %  3 %  


 


Eosinophils %  1 %  


 


Neutrophils # (Manual)  6.6 TH/MM3  


 


Differential Comment


  


  FINAL DIFF


MANUAL 


 


 


Atypical Lymphocytes   %  


 


Toxic Vacuolation  PRESENT  


 


Platelet Estimate  LOW  


 


Platelet Morphology Comment  NORMAL  


 


Basophilic Stippling  FAINT  








Imaging





Last Impressions








Gall Bladder Ultrasound 1/2/18 0456 Signed





Impressions: 





 Service Date/Time:  Tuesday, January 2, 2018 07:54 - CONCLUSION:  1. There is 





 diffuse abdominal ascites. 2. The liver appears quite small and heterogeneous 

in 





 echotexture consistent with cirrhosis. The patient has known pancreatic cancer 





 with several small hepatic lesions. These were not seen by the ultrasound. I 

do 





 not see evidence of intrahepatic biliary ductal dilation. The patient has a 





 known biliary stent in place. 3. There is minimal sludge in the dependent 





 portion the gallbladder. There is gallbladder wall thickening however, this is 





 not an unexpected finding with the presence of ascites.     Giorgi Soto MD 


 


Head CT 1/2/18 0301 Signed





Impressions: 





 Service Date/Time:  Tuesday, January 2, 2018 03:23 - CONCLUSION:  No acute 





 disease.       Clement Torres Jr., MD 


 


Chest X-Ray 1/2/18 0301 Signed





Impressions: 





 Service Date/Time:  Tuesday, January 2, 2018 03:16 - CONCLUSION:  Blunting of 





 the right costophrenic angle I either related to a small effusion or pleural 





 scarring. Otherwise, no infiltrates.     Clement Torres Jr., MD 








Objective Remarks


GENERAL: Very altered not able to answer questions appropriately very jaundiced 

yellow very confused not appropriate


SKIN: Warm and dry.  Severe jaundice


HEAD: Atraumatic. Normocephalic. 


EYES: Pupils equal and round.  Severe scleral icterus. No injection or 

drainage. 


ENT: No nasal bleeding or discharge.  Mucous membranes pink and moist.  Tongue 

is midline


NECK: Trachea midline. No JVD.  Supple


CARDIOVASCULAR: Regular rate and rhythm.  S1 and S2 no S3 or S4


RESPIRATORY: No accessory muscle use.  Few rhonchi. Breath sounds equal 

bilaterally. 


GASTROINTESTINAL: Abdomen soft, non-tender. Hepatic and splenic margins not 

palpable.  Positive Distended positive ascites


MUSCULOSKELETAL: Extremities without clubbing, cyanosis, or edema. No obvious 

deformities. 


NEUROLOGICAL: Awake and alert. No obvious cranial nerve deficits.  Motor 

grossly within normal limits. 4 out of 5 muscle strength in the arms and legs.  

ABNormal speech.


PSYCHIATRIC: INAppropriate mood and affect; insight and judgment ABnormal.  

Very confused not


Medications and IVs





Current Medications


Sodium Chloride 1,000 ml @  999 mls/hr BOLUS  ONCE IV  Last administered on 1/2/ 18at 04:30;  Start 1/2/18 at 04:30;  Stop 1/2/18 at 05:30;  Status DC


Vancomycin HCl 1000 mg/Sodium Chloride 250 ml @  250 mls/hr ONCE  ONCE IV  Last 

administered on 1/2/18at 04:30;  Start 1/2/18 at 04:30;  Stop 1/2/18 at 05:29;  

Status DC


Piperacillin Sod/ Tazobactam Sod 50 ml @  100 mls/hr ONCE  ONCE IV  Last 

administered on 1/2/18at 04:30;  Start 1/2/18 at 04:30;  Stop 1/2/18 at 04:59;  

Status DC


Sodium Chloride (NS Flush) 2 ml UNSCH  PRN IV FLUSH FLUSH AFTER USING IV ACCESS

;  Start 1/2/18 at 05:15


Sodium Chloride (NS Flush) 2 ml BID IV FLUSH  Last administered on 1/2/18at 08:

19;  Start 1/2/18 at 09:00


Naloxone HCl (Narcan Inj) 0.4 mg UNSCH  PRN IV PUSH SEE LABEL COMMENTS;  Start 1 /2/18 at 05:15


Senna/Docusate Sodium (Jackie-Colace) 1 tab BID PO ;  Start 1/2/18 at 09:00


Magnesium Hydroxide (Milk Of Magnesia Liq) 30 ml Q12H  PRN PO Mild constipation

;  Start 1/2/18 at 05:15


Sennosides (Senokot) 17.2 mg Q12H  PRN PO Moderate constipation;  Start 1/2/18 

at 05:15


Bisacodyl (Dulcolax Supp) 10 mg DAILY  PRN RECTAL SEVERE CONSITIPATION;  Start 1 /2/18 at 05:15


Lactulose (Lactulose Liq) 30 ml QID PO  Last administered on 1/2/18at 09:50;  

Start 1/2/18 at 09:00


Piperacillin Sod/ Tazobactam Sod 50 ml @  100 mls/hr Q6H IV ;  Start 1/2/18 at 

11:00


Pharmacy Profile Note 0 ml @ 0 mls/hr UNSCH OTHER ;  Start 1/2/18 at 05:45


Vancomycin HCl 1000 mg/Sodium Chloride 250 ml @  250 mls/hr Q12H IV ;  Start 1/2 /18 at 16:30;  Status UNV


Amlodipine Besylate (Norvasc) 5 mg DAILY PO  Last administered on 1/2/18at 09:51

;  Start 1/2/18 at 09:00


Furosemide (Lasix) 20 mg DAILY PO  Last administered on 1/2/18at 09:50;  Start 1 /2/18 at 09:00


Oxycodone HCl (Roxicodone) 5 mg Q6H  PRN PO PAIN;  Start 1/2/18 at 06:00


Rifaximin (Xifaxan) 550 mg BID PO  Last administered on 1/2/18at 09:51;  Start 1 /2/18 at 09:00





A/P


Assessment and Plan


Assessment/plan:





1.  Pancreatic cancer/AMS/Jaundice/bilirubinemia/hepatic encephalopathy


T bili 21.5, ammonia 91, AST/ALT 91/47


Patient with history of pancreatic duct stent, concern for blockage.  

Ultrasound pending for further evaluation.


Consult gastroenterology, medical oncology - appreciate recommendations


Lactulose for elevated ammonia


We will add rifaximin





2.  Elevated lactic acid


Suspect secondary to above


Chest x-ray without infiltrates


CBC pending


UA pending


Vancomycin/Zosyn until infectious rule out completed


Holding IV fluid hydration secondary to ascites and low albumin





3.  Ascites


Chronic


Last paracentesis on 12/18


Continue Lasix





4.  Hypertension


Continue amlodipine








5.  Coagulopathy severe probably secondary to liver metastases needs palliative 

care consult





Physical therapy and occupational therapy to eval and treat


Poor prognosis





-


FEN


NPO


Electrolytes: monitor and replete prn


SCDs


Holding pharmacologic anticoagulation as patient may require procedure--patient 

is currently self anticoagulating due to severe coagulopathy


Discharge Planning


Consult palliative care


Needs to be a full DO NOT RESUSCITATE


Patient's prognosis looks very poor











Eusebio Baca DO Jan 2, 2018 10:41

## 2018-01-02 NOTE — PD.CONS
Consult


Service


Palliative Care


Consult Requested By


Dr. Baca .


Primary Care Physician


No Primary Care Physician


Reason for Consultation


   a.  To assist with evaluation and management of symptoms including: weakness

, n/v,   pain, encephalopathy


   b.  To assist medical decision maker(s) with: better understanding of 

current medical conditions; weighing benefits/burdens of medical treatment 

options; making        


        medical treatment decisions.


 (Bernadette Gomez)





HPI


History of Present Illness


This 52-year-old male presented to the ED on 18 with family for generalized 

malaise, weakness, decreased appetite, some delirium, intermittent nausea/

vomiting 2 days.  He has known history of pancreatic cancer.  Patient last 

completed 1 cycle of Keytruda approximately 2 weeks ago.  Patient also with 

history of ongoing abdominal pain with ascites requiring paracentesis.  Patient 

has been on diuretics to aid with ascites management.  Patient with other 

medical history of COPD, stent placement biliary duct, hypertension.  Family 

reports no fever, no coughing, no congestion.  Positive for increased jaundice 

of skin and eyes 2 days.





* ED course: CT brain no acute process.  CXR= blunting right costophrenic angle 

small effusion versus pleural scarring per radiologist read. No infiltrates.  

Total bilirubin 21.5, alkaline phosphatase 184, AST 91, ALT 47.  INR 3.9.  

Lactic acid 6.3.  Ammonia 91.  Started on IV fluids, empiric antibiotics 

vancomycin, Zosyn.  ED physician notes patient confused.  Abdominal ultrasound 

pending.  GI, oncology consult pending.  Pan cultures pending.


* Started on lactulose, rifaximin.  PT and OT consulted.  Palliative care 

consulted to assist with clarification of goals of treatment.  WBC 7.8, 

hemoglobin 13.1, hematocrit 36.7, platelet 83. 


*  Gallbladder ultrasound= There is diffuse abdominal ascites. 2. The liver 

appears quite small and heterogeneous in echotexture consistent with cirrhosis. 

The patient has known pancreatic cancer  with several small hepatic lesions. 

These were not seen by the ultrasound. I do not see evidence of intrahepatic 

biliary ductal dilation. The patient has a known biliary stent in place. 3. 

There is minimal sludge in the dependent  portion the gallbladder. There is 

gallbladder wall thickening however, this is not an unexpected finding with the 

presence of ascites





Pt seen in room, wife at bedside. He is cachectic, ill appearing. awake, at 

times restless moving around in bed. Appears confused, does not verbalize for 

me. Intermittently follows commands. Moving all 4 extremities. Met w wife at 

bedside. 








Additional oncology history per review of electronic records:


== Patient originally diagnosed  presenting symptom of jaundice 

which his coworkers recommended he seek medical treatment for.  At the time 

pathology findings of moderately differentiated adenocarcinoma with mucinous 

features, no identified metastasis at that time, oncology consulted and 

following for staging and treatment discussions regarding chemotherapy, 

possible radiation and at some point surgical intervention depending on 

response.  Dr. Alarcon evaluated for possible future surgical procedures, 

though patient not a surgical candidate at that time.


== 2016 some metastatic process identified; patient required treatment 

for RUQ abscess after biliary drain removal.  Repeat staging with disease 

outside of pancreas.


== 2017 patient completed chemotherapy in January folfox, folfirenox, 

however due to ongoing thrombocytopenia unable to receive further treatment; 

continues to follow with oncology.  Weight 68.8 kg


== 2017 outpatient oncology records indicate continued disease 

progression. patient w/ continued weakness, fatigue and some weight loss weight 

60 kg.


== 2017- patient status post completion 1 cycle Keytruda.  Patient 

requiring some paracentesis for ascites though noted to not relieve discomfort.

  Patient with ongoing progression on cytotoxic agents.


Function/Cognitive Trajectory


lives at home w wife, required some assist  w ADLs. Periods of confusion. 





. 


 (Bernadette Gomez)





Review of Systems


Constitutional:  COMPLAINS OF: Fatigue, Weight loss, Change in appetite, Pain (

abdominal), Generalized weakness, DENIES: Fever


Ears, nose, mouth, throat:  DENIES: Oral lesions, Throat pain


Respiratory:  DENIES: Cough, Shortness of breath


Cardiovascular:  COMPLAINS OF: Lower Extremity Edema (chronic), DENIES: Chest 

pain


Gastrointestinal:  COMPLAINS OF: Abdominal pain (chronic), Nausea, Vomiting, 

Anorexia


Hematologic/Lymphatics:  COMPLAINS OF: Bruising


Psychiatric:  COMPLAINS OF: Confusion (worsened in the past few days) (Bernadette Gomez)





Past Family Social History


Coded Allergies:  


     MRI PRECAUTION (Verified  Allergy, Unknown, metal right temple not scan 

unless pt is alert and oriented, 18)


 please do not scan unless pt is alert and oriented, per CHIOMA Soto M.D. 18, dml


     No Known Allergies (Unverified  Allergy, Unknown, 18)


Past Medical History


Hypertension


Pancreatic cancer diagnosed 16


? COPD


.


Past Surgical History


Vasectomy


Port placement in 2016


CT-guided biopsy in 2016


Percutaneous drain in 2016, removed


biliary stent placement





.


Reported Medications





Amlodipine (Amlodipine Besylate) 5 Mg Tab 5 Mg PO DAILY


Furosemide 1 Tablet (of 20 mg) Oral daily


oxycodone (? dose) prn pain 


.





Current Medications








 Medications


  (Trade)  Dose


 Ordered  Sig/Amilcar


 Route  Start Time


 Stop Time Status Last Admin


 


  (NS Flush)  2 ml  UNSCH  PRN


 IV FLUSH  18 05:15


     


 


 


  (NS Flush)  2 ml  BID


 IV FLUSH  18 09:00


    18 08:19


 


 


  (Narcan Inj)  0.4 mg  UNSCH  PRN


 IV PUSH  18 05:15


     


 


 


  (Jackie-Colace)  1 tab  BID


 PO  18 09:00


     


 


 


  (Milk Of


 Magnesia Liq)  30 ml  Q12H  PRN


 PO  18 05:15


     


 


 


  (Senokot)  17.2 mg  Q12H  PRN


 PO  18 05:15


     


 


 


  (Dulcolax Supp)  10 mg  DAILY  PRN


 RECTAL  18 05:15


     


 


 


  (Lactulose Liq)  30 ml  QID


 PO  18 09:00


    18 09:50


 


 


 Piperacillin Sod/


 Tazobactam Sod  50 ml @ 


 100 mls/hr  Q6H


 IV  18 11:00


    18 11:25


 


 


 Pharmacy Profile


 Note  0 ml @ 0


 mls/hr  UNSCH


 OTHER  18 05:45


     


 


 


  (Norvasc)  5 mg  DAILY


 PO  18 09:00


    18 09:51


 


 


  (Lasix)  20 mg  DAILY


 PO  18 09:00


    18 09:50


 


 


  (Roxicodone)  5 mg  Q6H  PRN


 PO  18 06:00


     


 


 


  (Xifaxan)  550 mg  BID


 PO  18 09:00


    18 09:51


 


 


 Vancomycin HCl


 1250 mg/Sodium


 Chloride  262.5 ml @ 


 250 mls/hr  Q18H


 IV  18 16:00


     


 


 


 Miscellaneous


 Information  SPECIFIC LAB TO BE


 DRAWN:VANCOMYCIN


 TROUGH DATE TO...  ONCE  ONCE


 .XX  18 03:45


 18 03:46   


 








Family History


Father  in his 40s of heart attack, mother living in her 70s with advanced 

Alzheimer's.


Substance Use


Tobacco: Former smoker quit a few years ago, previously smoked since age 12


Alcohol: 6-12 beers a day 30 years


Prescription med abuse: None reported


Illicits: Smokes marijuana


Psychosocial History


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 

worked outdoor labor/construction type jobs.   He has not been able to work 

since cancer diagnosis.   to his wife x 18 yrs in April (they have 

 a few times before marriage 18 yrs ago). Pt has 2 children, son and 

daughter, who are adults that live with them to assist w his care. 3 Yo 

grandchild also lives w them. 


.


Spiritual/Cultural Factors


no particular affiliation, wife does not feel he would want  visit.


 (Bernadette Gomez)


Living Will:  Never completed


Health Care Surrogate:  Never completed


Durable Power of :  Never completed


Ethical and Legal Issues


Pt is unable to participate in decisions due to confusion. Not clear if he will 

regain capacity. No written HCS or advanced directive. Wife would be 

appropriate proxy per [place] statutes. 


.


 (Bernadette Gomez)





Physical Exam





Vital Signs








  Date Time  Temp Pulse Resp B/P (MAP) Pulse Ox O2 Delivery O2 Flow Rate FiO2


 


18 11:17 97.4 111 18 179/92 (121) 95   


 


18 11:00  108      


 


18 08:06 97.7 102 18 171/92 (118) 95   


 


18 06:29    175/90 (118)    


 


18 06:27  99 13 179/95 (123) 96   


 


18 04:35  96 20 151/77 (101) 96 Room Air  


 


18 03:38  109 24 175/88 (117) 97 Room Air  


 


18 03:27  106 22  98 Room Air  


 


18 02:55  108 24 182/98 (126) 99 Room Air  


 


18 02:38 97.9 124 18 179/100 (126) 95 Room Air  

















 1/2/18 1/3/18





 19:00 07:00


 


Intake Total 50 ml 


 


Balance 50 ml 


 


  


 


Intake IV Total 50 ml 








Exam


CONSTITUTIONAL/GENERAL: This is a frail, cachectic ill-appearing patient


TUBES/LINES/DRAINS: Port accessed right subclavian


SKIN: +jaundice, + multiple areas scattered Ecchymoses on upper extremities.  

Dressing over reported skin tear right elbow.  Skin warm and dry.


HEAD: Atraumatic. Normocephalic.+ Temporal wasting


EYES: Pupils 4 mm, sluggish reaction to light.  Extraocular motions intact. +

scleral icterus. No injection or drainage. Fundi not examined.


ENT:  Nose without bleeding or purulent drainage.  Does not open mouth for me 

to visualize oropharynx.+ Poor dentition


NECK: Trachea midline. Supple, nontender. 


CARDIOVASCULAR: Regular rate and rhythm without murmur. No JVD. Peripheral 

pulses symmetric.


RESPIRATORY/CHEST: Symmetric, unlabored respirations.  On room air.  Clear to 

auscultation. Breath sounds equal bilaterally. 


GASTROINTESTINAL: Abdomen firm, tender with exam, +distended.  limited 

palpation secondary to grimacing/pain.+ Ascites.  Bowel sounds hypoactive.


GENITOURINARY: Without palpable bladder distension. 


MUSCULOSKELETAL: Extremities without clubbing, cyanosis. + 1 Edema to lower 

legs . No joint tenderness or effusion noted. 


LYMPHATICS: No palpable cervical or supraclavicular adenopathy.


NEUROLOGICAL: Awake and alert.  He does not verbalize for me.  Appears 

confused.  Intermittently follows simple commands.  Observe to move all 4 

extremities moving self around in bed --restless.  


PSYCHIATRIC: Restless at times.  No apparent hallucinations or other psychotic 

thought process.


 (Bernadette Gomez)





Diagnostic Tests


Laboratory





Laboratory Tests








Test


  18


03:15 18


05:15 18


08:19 18


11:45


 


Prothrombin Time


  39.5 SEC


(9.8-11.6) 


  


  


 


 


Prothromb Time International


Ratio 3.9 RATIO 


  


  


  


 


 


Activated Partial


Thromboplast Time 50.1 SEC


(24.3-30.1) 


  


  


 


 


Blood Urea Nitrogen


  37 MG/DL


(7-18) 


  


  


 


 


Creatinine


  1.23 MG/DL


(0.60-1.30) 


  


  


 


 


Random Glucose


  101 MG/DL


() 


  


  


 


 


Total Protein


  7.4 GM/DL


(6.4-8.2) 


  


  


 


 


Albumin


  2.1 GM/DL


(3.4-5.0) 


  


  


 


 


Calcium Level


  8.0 MG/DL


(8.5-10.1) 


  


  


 


 


Alkaline Phosphatase


  184 U/L


() 


  


  


 


 


Aspartate Amino Transf


(AST/SGOT) 91 U/L (15-37) 


  


  


  


 


 


Alanine Aminotransferase


(ALT/SGPT) 47 U/L (12-78) 


  


  


  


 


 


Total Bilirubin


  21.5 MG/DL


(0.2-1.0) 


  


  


 


 


Sodium Level


  134 MEQ/L


(136-145) 


  


  


 


 


Potassium Level


  4.0 MEQ/L


(3.5-5.1) 


  


  


 


 


Chloride Level


  99 MEQ/L


() 


  


  


 


 


Carbon Dioxide Level


  25.9 MEQ/L


(21.0-32.0) 


  


  


 


 


Anion Gap 9 MEQ/L (5-15)    


 


Estimat Glomerular Filtration


Rate 62 ML/MIN


(>89) 


  


  


 


 


Lactic Acid Level


  6.3 mmol/L


(0.4-2.0) 


  3.8 mmol/L


(0.4-2.0) 5.2 mmol/L


(0.4-2.0)


 


Ammonia


  91 MCMOL/L


(11-32) 


  


  


 


 


Lipase


  46 U/L


() 


  


  


 


 


White Blood Count


  


  7.8 TH/MM3


(4.0-11.0) 


  


 


 


Red Blood Count


  


  3.80 MIL/MM3


(4.50-5.90) 


  


 


 


Hemoglobin


  


  13.1 GM/DL


(13.0-17.0) 


  


 


 


Hematocrit


  


  36.7 %


(39.0-51.0) 


  


 


 


Mean Corpuscular Volume


  


  96.7 FL


(80.0-100.0) 


  


 


 


Mean Corpuscular Hemoglobin


  


  34.4 PG


(27.0-34.0) 


  


 


 


Mean Corpuscular Hemoglobin


Concent 


  35.6 %


(32.0-36.0) 


  


 


 


Red Cell Distribution Width


  


  17.4 %


(11.6-17.2) 


  


 


 


Platelet Count


  


  83 TH/MM3


(150-450) 


  


 


 


Mean Platelet Volume


  


  7.7 FL


(7.0-11.0) 


  


 


 


Neutrophils (%) (Auto)


  


  80.3 %


(16.0-70.0) 


  


 


 


Lymphocytes (%) (Auto)


  


  11.7 %


(9.0-44.0) 


  


 


 


Monocytes (%) (Auto)


  


  7.1 %


(0.0-8.0) 


  


 


 


Eosinophils (%) (Auto)


  


  0.6 %


(0.0-4.0) 


  


 


 


Basophils (%) (Auto)


  


  0.3 %


(0.0-2.0) 


  


 


 


Neutrophils # (Auto)


  


  6.2 TH/MM3


(1.8-7.7) 


  


 


 


Lymphocytes # (Auto)


  


  0.9 TH/MM3


(1.0-4.8) 


  


 


 


Monocytes # (Auto)


  


  0.6 TH/MM3


(0-0.9) 


  


 


 


Eosinophils # (Auto)


  


  0.0 TH/MM3


(0-0.4) 


  


 


 


Basophils # (Auto)


  


  0.0 TH/MM3


(0-0.2) 


  


 


 


CBC Comment  AUTO DIFF   


 


Differential Total Cells


Counted 


  100 


  


  


 


 


Neutrophils % (Manual)  66 % (16-70)   


 


Band Neutrophils %  18 % (0-6)   


 


Lymphocytes %  12 % (9-44)   


 


Monocytes %  3 % (0-8)   


 


Eosinophils %  1 % (0-4)   


 


Neutrophils # (Manual)


  


  6.6 TH/MM3


(1.8-7.7) 


  


 


 


Differential Comment


  


  FINAL DIFF


MANUAL 


  


 


 


Atypical Lymphocytes   % (0-0)   


 


Toxic Vacuolation


  


  PRESENT (NONE


SEEN) 


  


 


 


Platelet Estimate  LOW (NORMAL)   


 


Platelet Morphology Comment


  


  NORMAL


(NORMAL) 


  


 


 


Basophilic Stippling  FAINT (NORMAL)   








 (Bernadette Gomez)


Result Diagram:  


18 0515                                                                    

            18 0315





Microbiology





Microbiology








 Date/Time


Source Procedure


Growth Status


 


 


 18 03:15


Blood Peripheral Aerobic Blood Culture


Pending Received


 


 18 03:15


Blood Peripheral Anaerobic Blood Culture


Pending Received





 18 03:10


Blood Peripheral Aerobic Blood Culture


Pending Received


 


 18 03:10


Blood Peripheral Anaerobic Blood Culture


Pending Received








Imaging





Last Impressions








Gall Bladder Ultrasound 18 1814 Signed





Impressions: 





 Service Date/Time:  2018 07:54 - CONCLUSION:  1. There is 





 diffuse abdominal ascites. 2. The liver appears quite small and heterogeneous 

in 





 echotexture consistent with cirrhosis. The patient has known pancreatic cancer 





 with several small hepatic lesions. These were not seen by the ultrasound. I 

do 





 not see evidence of intrahepatic biliary ductal dilation. The patient has a 





 known biliary stent in place. 3. There is minimal sludge in the dependent 





 portion the gallbladder. There is gallbladder wall thickening however, this is 





 not an unexpected finding with the presence of ascites.     Giorgi Soto MD 


 


Head CT 18 Signed





Impressions: 





 Service Date/Time:  2018 03:23 - CONCLUSION:  No acute 





 disease.       Clement Torres Jr., MD 


 


Chest X-Ray 18 Signed





Impressions: 





 Service Date/Time:  2018 03:16 - CONCLUSION:  Blunting of 





 the right costophrenic angle I either related to a small effusion or pleural 





 scarring. Otherwise, no infiltrates.     Clement Torres Jr., MD 








 (Bernadette Gomez)





Patient/Family Conference


Present at Family Conference:


wife


Family Conference Time (mins):  40 (minutes)


Family Conference Location:  Bedside


Issues Discussed:


Met with wife at bedside, discussion included:


* Palliative care role, purpose, approach


* Additional medical, psychosocial, and spiritual history


* Patients general health, functional status, and cognitive changes in the 

months leading up to the current hospitalization


* Patient/family understanding of the current medical problems


* Patient/family understanding of prognosis-review of underlying diagnoses of 

pancreatic cancer as well as patient trajectory over the past 1 year


* Patients goals of care as best understood from advance directives and/or 

conversations and/or values


* Current medical treatment options and benefits/burdens of those options--

review of medical treatments in place, GI evaluation, oncology consultation 

pending


* Legal decision makers per [place] statutes; patient with no advanced directive


* Review of CODE STATUS and what resuscitation entails-benefits/burdens-she 

indicates she did discuss this with the ER physician and she is struggling to 

not provide interventions in case something is treatable or reversible he would 

remain full code for now though she is going to consider this further in the 

coming days


* Questions answered to the best of my ability


* Palliative care contact information provided





Patient wife appears overwhelmed at times.  Indicates she is slept about 3 

hours in the past 2 days, due to caring for him.  She details a general decline 

over the past many months : Decrease in patient's weight, eating, strength etc.

  Over the past few days significant confusion,  almost no oral intake (taking  

fluids occasionally), and episode of emesis when she gave him a pain pill.


Gently explore with her treatment course over the past 1 year as per review of 

EMR.  She indicates last follow-up with Dr. Alves indicated patient was 

stable and seemed to be doing okay they were hopeful about follow-up planned 

for next week to continue Keytruda.  She also acknowledges that she understood 

some point patient disease would not respond and that his condition would be 

determined although she was hopeful to get more time with ongoing treatments.  

Gently explore current acute issues as identified in workup, and that GI, 

oncology consultations are still pending though at some point inpatient disease 

process he may become too ill , and too debilitated to continue with ongoing 

treatments and at that time he may only have the option for comfort measures.  

She will await oncology, GI input; for now goals are aggressive though she is 

open to ongoing conversations as clinical course evolves.


 (Bernadette Gomez)





Assessment and Plan


Disease Oriented Problem List:  


(1) Pancreatic mass


(2) Primary malignant neoplasm of pancreas with metastasis to other site


(3) Hepatic encephalopathy


(4) Jaundice


(5) Lactic acidosis


(6) Ascites


(7) Hypertension


Symptom Scale:  


(1) Encephalopathy


0-10 Scale:  Unable to quantify





(2) Confusion


0-10 Scale:  Unable to quantify





(3) Malnutrition


0-10 Scale:  Unable to quantify





(4) Pain, abdominal


0-10 Scale:  Unable to quantify





Pertinent Non-Medical Issues


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 

always worked outdoor labor/construction type jobs.   He has not been able to 

work since cancer diagnosis.   to his wife x 18 yrs in April (they have 

 a few times before marriage 18 yrs ago). Pt has 2 children, son and 

daughter, who are adults that live with them to assist w his care. 3 Yo 

grandchild also lives w them. 


Spiritual: No particular [employer]ism affiliation wife feels he would not want 

 visits


Legal:Pt is unable to participate in decisions due to confusion. Not clear if 

he will regain capacity. No written HCS or advanced directive. Wife would be 

appropriate proxy per [place] statutes. 


Ethical issues impacting care:


Important Contacts


wife Radha Chino [phone]


.


Prognosis





This patient was admitted for weakness, nausea vomiting, confusion and 

decreased oral intake.  He has known history of metastatic pancreatic cancer 

diagnoses 2016.  He has had disease progression, has not tolerated 

chemotherapy, is status post 1 course of  Keytruda 2-3 weeks ago.  He has had 

significant weight loss, is now having significant liver dysfunction which is 

concerning to be secondary to disease process.  He may not be a candidate for 

further aggressive cancer treatment.  Appropriate for hospice if goals are 

compatible.


.


Plan





* Legal decision maker: Pt is unable to participate in decisions due to 

confusion. Not clear if he will regain capacity. No written HCS or advanced 

directive. Wife would be appropriate proxy per [place] statutes. 


   


* Goals: Wife is hopeful patient acute issues can be stabilized and he can 

return home to resume treatment for his underlying cancer.  She understands 

treatment is palliative in nature though she was hopeful he would get more time 

with this.  And ongoing conversation regarding goals as condition/course 

evolves.


   


* CODE STATUS: full code


   


   SYMPTOMS:


   --weakness - deconditioning, debilitated, progressive weight loss since 

initial cancer diagnosis.  PT/OT ordered, patient chronically debilitated not 

likely to restore to prior levels of function and strength


   --Encephalopathy- hepatic,?  Sepsis, on rifaximin, lactulose, ammonia 91, 

lactic acid 6, at times with episodes of confusion in the home setting 

primarily with hepatic dysfunction episodes or with pain medication occasionally


   --n/v/malnutrition- poor oral intake for the past few days taking only sips 

of fluids.  One episode of emesis prior to admission following administration 

of pain medication.  Prior that progressive weight loss over the past many 

months, and very little oral intake due to decreased appetite not feeling 

hungry.


   --Abdominal pain-unable to rate currently, wife describes ongoing abdominal 

pain and discomfort which worsens with ascites; some slight relief with use of 

prn oxycodone outpatient and paracentesis.  Cautious use of opioids given 

altered mental status and risk for decline; has been ordered for oxycodone 5 mg 

every 6 hours prn-- monitor requirements/effectiveness





   Palliative care will continue to follow during hospital course as condition 

evolves, to assist patient/decision-maker with understanding of medical 

conditions, weighing benefits/burdens of treatment options, for clarification 

of goals of treatment.  Additionally will assist with any symptoms of 

palliative concern





 (Bernadette Gomez)





Time Spent


Total Floor Time (mins):  60 (Chart review, PE, discussion with wife, 

discussion with nursing)


 (Bernadette Gomez)





Thank you for the opportunity to participate in the care of Mr. Chino.


 


 (Bernadette Gomez)





Attestation


To help prompt me to consider important information that might be impacting 

today's encounter and assessment, information from prior notes written by 

myself or my colleagues may have been "brought forward" into today's note.  My 

signature on this note, however, is an attestation that I personally performed 

the exam, history, and/or decision-making noted today, and, unless otherwise 

indicated, the interactions with patient, family, and staff as well as the 

review of records all occurred today.  I also attest that the listed assessment 

and stated plan reflect my best clinical judgment today based on the 

combination of historical information, prior notes, and today's exam/ 

interactions.  When time spent is documented, it refers only to time spent 

today by the signer, or if indicated, combined time spent today by 

collaborating physician/nurse practitioner.


 (Bernadette Gomez)


Collaborating MD Comments


Chart reviewed.  Case discussed with palliative care ARNP.  Above ARNP note 

reviewed and I concur.


.


 (James Jordan MD)











Bernadette Gomez 2018 14:01


James Jordan MD 2018 14:49

## 2018-01-03 VITALS
SYSTOLIC BLOOD PRESSURE: 165 MMHG | OXYGEN SATURATION: 96 % | DIASTOLIC BLOOD PRESSURE: 81 MMHG | RESPIRATION RATE: 18 BRPM | TEMPERATURE: 97.8 F | HEART RATE: 96 BPM

## 2018-01-03 VITALS
DIASTOLIC BLOOD PRESSURE: 87 MMHG | SYSTOLIC BLOOD PRESSURE: 160 MMHG | HEART RATE: 99 BPM | OXYGEN SATURATION: 96 % | TEMPERATURE: 97.6 F | RESPIRATION RATE: 12 BRPM

## 2018-01-03 VITALS
HEART RATE: 104 BPM | RESPIRATION RATE: 18 BRPM | DIASTOLIC BLOOD PRESSURE: 79 MMHG | SYSTOLIC BLOOD PRESSURE: 159 MMHG | OXYGEN SATURATION: 95 % | TEMPERATURE: 97.5 F

## 2018-01-03 VITALS
OXYGEN SATURATION: 96 % | HEART RATE: 101 BPM | SYSTOLIC BLOOD PRESSURE: 141 MMHG | TEMPERATURE: 97.4 F | DIASTOLIC BLOOD PRESSURE: 77 MMHG | RESPIRATION RATE: 22 BRPM

## 2018-01-03 VITALS
HEART RATE: 102 BPM | RESPIRATION RATE: 14 BRPM | SYSTOLIC BLOOD PRESSURE: 165 MMHG | OXYGEN SATURATION: 96 % | TEMPERATURE: 98.7 F | DIASTOLIC BLOOD PRESSURE: 79 MMHG

## 2018-01-03 VITALS — SYSTOLIC BLOOD PRESSURE: 147 MMHG | DIASTOLIC BLOOD PRESSURE: 84 MMHG

## 2018-01-03 VITALS
SYSTOLIC BLOOD PRESSURE: 149 MMHG | HEART RATE: 101 BPM | TEMPERATURE: 97.1 F | RESPIRATION RATE: 20 BRPM | OXYGEN SATURATION: 97 % | DIASTOLIC BLOOD PRESSURE: 76 MMHG

## 2018-01-03 LAB
ALBUMIN SERPL-MCNC: 1.7 GM/DL (ref 3.4–5)
ALP SERPL-CCNC: 145 U/L (ref 45–117)
ALT SERPL-CCNC: 46 U/L (ref 12–78)
AMYLASE SERPL-CCNC: 25 U/L (ref 25–115)
AST SERPL-CCNC: 103 U/L (ref 15–37)
BASOPHILS # BLD AUTO: 0 TH/MM3 (ref 0–0.2)
BASOPHILS NFR BLD: 0.3 % (ref 0–2)
BILIRUB SERPL-MCNC: 18.7 MG/DL (ref 0.2–1)
BUN SERPL-MCNC: 41 MG/DL (ref 7–18)
CALCIUM SERPL-MCNC: 7.6 MG/DL (ref 8.5–10.1)
CHLORIDE SERPL-SCNC: 104 MEQ/L (ref 98–107)
CREAT SERPL-MCNC: 1.31 MG/DL (ref 0.6–1.3)
EOSINOPHIL # BLD: 0 TH/MM3 (ref 0–0.4)
EOSINOPHIL NFR BLD: 0.3 % (ref 0–4)
ERYTHROCYTE [DISTWIDTH] IN BLOOD BY AUTOMATED COUNT: 17.4 % (ref 11.6–17.2)
GFR SERPLBLD BASED ON 1.73 SQ M-ARVRAT: 57 ML/MIN (ref 89–?)
GLUCOSE SERPL-MCNC: 117 MG/DL (ref 74–106)
HBA1C MFR BLD: 4.6 % (ref 4.3–6)
HCO3 BLD-SCNC: 22.8 MEQ/L (ref 21–32)
HCT VFR BLD CALC: 34.2 % (ref 39–51)
HGB BLD-MCNC: 11.7 GM/DL (ref 13–17)
INR PPP: 4.8 RATIO
LIPASE: 48 U/L (ref 73–393)
LYMPHOCYTES # BLD AUTO: 1.3 TH/MM3 (ref 1–4.8)
LYMPHOCYTES NFR BLD AUTO: 10.3 % (ref 9–44)
Lab: 31.7 SEC (ref 24.3–30.1)
MAGNESIUM SERPL-MCNC: 2.7 MG/DL (ref 1.5–2.5)
MCH RBC QN AUTO: 33.1 PG (ref 27–34)
MCHC RBC AUTO-ENTMCNC: 34.3 % (ref 32–36)
MCV RBC AUTO: 96.4 FL (ref 80–100)
MONOCYTE #: 0.9 TH/MM3 (ref 0–0.9)
MONOCYTES NFR BLD: 6.5 % (ref 0–8)
NEUTROPHILS # BLD AUTO: 10.7 TH/MM3 (ref 1.8–7.7)
NEUTROPHILS NFR BLD AUTO: 82.6 % (ref 16–70)
PHOSPHATE SERPL-MCNC: 1.8 MG/DL (ref 2.5–4.9)
PLATELET # BLD: 105 TH/MM3 (ref 150–450)
PMV BLD AUTO: 7.4 FL (ref 7–11)
PROT SERPL-MCNC: 6.3 GM/DL (ref 6.4–8.2)
PROTHROMBIN TIME: 48.4 SEC (ref 9.8–11.6)
PT IMM NP PPP: 13.1 SEC (ref 9.8–11.6)
RBC # BLD AUTO: 3.54 MIL/MM3 (ref 4.5–5.9)
SODIUM SERPL-SCNC: 137 MEQ/L (ref 136–145)
T4 FREE SERPL-MCNC: 1.49 NG/DL (ref 0.76–1.46)
WBC # BLD AUTO: 13 TH/MM3 (ref 4–11)

## 2018-01-03 RX ADMIN — TAZOBACTAM SODIUM AND PIPERACILLIN SODIUM SCH MLS/HR: 375; 3 INJECTION, SOLUTION INTRAVENOUS at 16:34

## 2018-01-03 RX ADMIN — WATER SCH ML: 1 IRRIGANT IRRIGATION at 16:34

## 2018-01-03 RX ADMIN — TAZOBACTAM SODIUM AND PIPERACILLIN SODIUM SCH MLS/HR: 375; 3 INJECTION, SOLUTION INTRAVENOUS at 23:45

## 2018-01-03 RX ADMIN — STANDARDIZED SENNA CONCENTRATE AND DOCUSATE SODIUM SCH TAB: 8.6; 5 TABLET, FILM COATED ORAL at 21:00

## 2018-01-03 RX ADMIN — WATER SCH ML: 1 IRRIGANT IRRIGATION at 21:11

## 2018-01-03 RX ADMIN — Medication SCH ML: at 09:29

## 2018-01-03 RX ADMIN — RIFAXIMIN SCH MG: 550 TABLET ORAL at 21:10

## 2018-01-03 RX ADMIN — TAZOBACTAM SODIUM AND PIPERACILLIN SODIUM SCH MLS/HR: 375; 3 INJECTION, SOLUTION INTRAVENOUS at 10:58

## 2018-01-03 RX ADMIN — TAZOBACTAM SODIUM AND PIPERACILLIN SODIUM SCH MLS/HR: 375; 3 INJECTION, SOLUTION INTRAVENOUS at 04:24

## 2018-01-03 RX ADMIN — RIFAXIMIN SCH MG: 550 TABLET ORAL at 09:29

## 2018-01-03 RX ADMIN — PANTOPRAZOLE SODIUM SCH MG: 40 INJECTION, POWDER, FOR SOLUTION INTRAVENOUS at 16:34

## 2018-01-03 RX ADMIN — WATER SCH ML: 1 IRRIGANT IRRIGATION at 09:26

## 2018-01-03 RX ADMIN — STANDARDIZED SENNA CONCENTRATE AND DOCUSATE SODIUM SCH TAB: 8.6; 5 TABLET, FILM COATED ORAL at 09:00

## 2018-01-03 RX ADMIN — Medication SCH ML: at 21:11

## 2018-01-03 RX ADMIN — SODIUM CHLORIDE SCH MLS/HR: 900 INJECTION INTRAVENOUS at 09:28

## 2018-01-03 RX ADMIN — WATER SCH ML: 1 IRRIGANT IRRIGATION at 10:57

## 2018-01-03 NOTE — HHI.PR
Subjective


Remarks


patient looks very ill and debilitated, lethargic but arousable to verbal 

stimuli


Afebrile overnight


poor Prognosis





Objective


Vitals





Vital Signs








  Date Time  Temp Pulse Resp B/P (MAP) Pulse Ox O2 Delivery O2 Flow Rate FiO2


 


1/3/18 11:15    147/84 (105)    


 


1/3/18 11:13 97.8 96 18 165/81 (109) 96   


 


1/3/18 09:24  101      


 


1/3/18 09:24 97.4 102 22 141/77 (98) 96   


 


1/3/18 04:28 97.5 104 18 159/79 (105) 95   


 


1/2/18 23:53 97.1 103 16 151/72 (98) 95   


 


1/2/18 20:45 96.0 108 14 148/83 (104) 94   


 


1/2/18 20:30  103      














I/O      


 


 1/2/18 1/2/18 1/2/18 1/3/18 1/3/18 1/3/18





 07:00 15:00 23:00 07:00 15:00 23:00


 


Intake Total  50 ml 50 ml 630 ml  


 


Output Total   1 ml 500 ml  


 


Balance  50 ml 49 ml 130 ml  


 


      


 


Intake Oral   0 ml 50 ml  


 


IV Total  50 ml 50 ml 580 ml  


 


Output Urine Total   1 ml 500 ml  


 


# Bowel Movements   1   








Result Diagram:  


1/3/18 0355                                                                    

            1/3/18 0355





Objective Remarks


__GENERAL: This is frail elderly patient, lethargic


CARDIOVASCULAR: Regular rate and rhythm without murmurs, gallops, or rubs. 


RESPIRATORY: fair air entry  


GASTROINTESTINAL: Abdomen soft,positive distention and ascites


MUSCULOSKELETAL: Extremities without clubbing, cyanosis, or edema.


NEURO:  lethargic





A/P


Assessment and Plan


1/3/18: Blood culture came back gram-negative rods, increased lactic acid, 

palliative care consulted





A/P:


1.  Pancreatic cancer/AMS/Jaundice/bilirubinemia/hepatic encephalopathy


T bili 21.5, ammonia 91, AST/ALT 91/47


Patient with history of pancreatic duct stent, concern for blockage.  

Ultrasound pending for further evaluation.


Consult gastroenterology, medical oncology - appreciate recommendations


Lactulose for elevated ammonia add rifaximin





2.  Elevated lactic acid


Suspect secondary to above


Chest x-ray without infiltrates


CBCmonitoring


UA 


Vancomycin/Zosyn until infectious rule out completed


Holding IV fluid hydration secondary to ascites and low albumin





3.  Ascites


Chronic


Last paracentesis on 12/18


Continue Lasix





4.  Hypertension


Continue amlodipine








5.  Coagulopathy severe probably secondary to liver metastases needs palliative 

care consult





Physical therapy and occupational therapy to eval and treat


Poor prognosis





-


FEN


NPO


Electrolytes: monitor and replete prn


GAYATRIs











Bernardino Jansen MD [place] 3, 2018 15:40

## 2018-01-03 NOTE — HHI.GIFU
Subjective


Remarks


To patient down for MRI but unable to proceed with the procedure.  Patient 

could not lie flat or still.


Wife currently in room, hoping to take patient home and that he will pull 

through this


Patient is fairly drowsy and incapacitated at this time but did receive some 

pain medicines recently


Sclera and skin are icteric


No paracentesis has been done, pending?





Objective


Vitals I&O





Vital Signs








  Date Time  Temp Pulse Resp B/P (MAP) Pulse Ox O2 Delivery O2 Flow Rate FiO2


 


1/3/18 11:15    147/84 (105)    


 


1/3/18 11:13 97.8 96 18 165/81 (109) 96   


 


1/3/18 09:24 97.4 102 22 141/77 (98) 96   


 


1/3/18 04:28 97.5 104 18 159/79 (105) 95   


 


1/2/18 23:53 97.1 103 16 151/72 (98) 95   


 


1/2/18 20:45 96.0 108 14 148/83 (104) 94   


 


1/2/18 20:30  103      


 


1/2/18 15:15 96.4 109 18 171/93 (119) 97   














I/O      


 


 1/2/18 1/2/18 1/2/18 1/3/18 1/3/18 1/3/18





 07:00 15:00 23:00 07:00 15:00 23:00


 


Intake Total  50 ml 50 ml 630 ml  


 


Output Total   1 ml 500 ml  


 


Balance  50 ml 49 ml 130 ml  


 


      


 


Intake Oral   0 ml 50 ml  


 


IV Total  50 ml 50 ml 580 ml  


 


Output Urine Total   1 ml 500 ml  


 


# Bowel Movements   1   








Laboratory





Laboratory Tests








Test


  1/2/18


14:30 1/2/18


22:30 1/3/18


03:55


 


White Blood Count 12.4   13.0 


 


Red Blood Count 3.73   3.54 


 


Hemoglobin 12.9   11.7 


 


Hematocrit 35.8   34.2 


 


Mean Corpuscular Volume 96.0   96.4 


 


Mean Corpuscular Hemoglobin 34.6   33.1 


 


Mean Corpuscular Hemoglobin


Concent 36.0 


  


  34.3 


 


 


Red Cell Distribution Width 17.8   17.4 


 


Platelet Count 140   105 


 


Mean Platelet Volume 8.2   7.4 


 


Prothrombin Time 42.2   48.4 


 


Prothromb Time International


Ratio 4.2 


  


  4.8 


 


 


Activated Partial


Thromboplast Time 49.9 


  


  55.9 


 


 


Total Bilirubin 20.4   18.7 


 


Direct Bilirubin 14.2   


 


Indirect Bilirubin 6.2   


 


Lactate Dehydrogenase 271   


 


Total Protein 7.1   6.3 


 


Urine Color  DARK-BROWN  


 


Urine Turbidity  HAZY  


 


Urine pH  6.0  


 


Urine Specific Gravity  1.025  


 


Urine Protein  TRACE  


 


Urine Glucose (UA)  NEG  


 


Urine Ketones  10  


 


Urine Occult Blood  SMALL  


 


Urine Nitrite  NEG  


 


Urine Bilirubin  LARGE  


 


Urine Urobilinogen  LESS THAN 2.0  


 


Urine Leukocyte Esterase  NEG  


 


Urine RBC  1  


 


Urine WBC  1  


 


Urine Amorphous Sediment  RARE  


 


Urine Bacteria  RARE  


 


Urine Hyaline Casts  6  


 


Urine Mucus  FEW  


 


Microscopic Urinalysis Comment


  


  CULT NOT


INDICATED 


 


 


Neutrophils (%) (Auto)   82.6 


 


Lymphocytes (%) (Auto)   10.3 


 


Monocytes (%) (Auto)   6.5 


 


Eosinophils (%) (Auto)   0.3 


 


Basophils (%) (Auto)   0.3 


 


Neutrophils # (Auto)   10.7 


 


Lymphocytes # (Auto)   1.3 


 


Monocytes # (Auto)   0.9 


 


Eosinophils # (Auto)   0.0 


 


Basophils # (Auto)   0.0 


 


CBC Comment   DIFF FINAL 


 


Differential Comment    


 


Prothrombin Time Control   11.0 


 


APTT Control   25.7 


 


Mix PT Normal Plasma Immediate   10.7 


 


Mix PT Normal Plasma 1 Hour   10.9 


 


Mix PT Patient/Normal 1:4


Immediate 


  


  11.4 


 


 


Mix PT Patient/Normal 1:1


Immediate 


  


  13.4 


 


 


Mix PT Patient/Normal 1:1 1


Hr 37c 


  


  13.1 


 


 


Mix PT Patient/Normal 4:1


Immediate 


  


  17.4 


 


 


Mix PT Patient Pre-Incubation   53.5 


 


Mix PTT Normal Plasma


Immediate 


  


  24.9 


 


 


Mix PTT Normal Plasma 1 Hour   25.9 


 


Mix PTT Patient/Normal 1:4


Immed 


  


  24.5 


 


 


Mix PTT Patient/Normal 1:1   26.4 


 


Mix PTT Patient/Normal 1:1 1


Hr 37c 


  


  29.4 


 


 


Mix PTT Patient/Normal 4:1


Immed 


  


  31.7 


 


 


Mix PTT Patient Pre-Incubation   54.5 


 


Coagulation Factor Inhibitor


Screen 


  


   


 


 


Blood Urea Nitrogen   41 


 


Creatinine   1.31 


 


Random Glucose   117 


 


Albumin   1.7 


 


Calcium Level   7.6 


 


Phosphorus Level   1.8 


 


Magnesium Level   2.7 


 


Alkaline Phosphatase   145 


 


Aspartate Amino Transf


(AST/SGOT) 


  


  103 


 


 


Alanine Aminotransferase


(ALT/SGPT) 


  


  46 


 


 


Sodium Level   137 


 


Potassium Level   3.9 


 


Chloride Level   104 


 


Carbon Dioxide Level   22.8 


 


Anion Gap   10 


 


Estimat Glomerular Filtration


Rate 


  


  57 


 


 


Ammonia   57 


 


Amylase Level   25 


 


Lipase   48 


 


Free Thyroxine   1.49 


 


Thyroid Stimulating Hormone


3rd Gen 


  


  0.148 


 














 Date/Time


Source Procedure


Growth Status


 


 


 1/2/18 03:15


Blood Peripheral Aerobic Blood Culture - Preliminary


NO GROWTH IN 1 DAY Resulted


 


 1/2/18 03:15 Anaerobic Blood Culture - Preliminary


Gram Negative [place] Resulted








Imaging





Last Impressions








Gall Bladder Ultrasound 1/2/18 0746 Signed





Impressions: 





 Service Date/Time:  Tuesday, January 2, 2018 07:54 - CONCLUSION:  1. There is 





 diffuse abdominal ascites. 2. The liver appears quite small and heterogeneous 

in 





 echotexture consistent with cirrhosis. The patient has known pancreatic cancer 





 with several small hepatic lesions. These were not seen by the ultrasound. I 

do 





 not see evidence of intrahepatic biliary ductal dilation. The patient has a 





 known biliary stent in place. 3. There is minimal sludge in the dependent 





 portion the gallbladder. There is gallbladder wall thickening however, this is 





 not an unexpected finding with the presence of ascites.     Giorgi Soto MD 


 


Head CT 1/2/18 0301 Signed





Impressions: 





 Service Date/Time:  Tuesday, January 2, 2018 03:23 - CONCLUSION:  No acute 





 disease.       Clement Torres Jr., MD 


 


Chest X-Ray 1/2/18 0301 Signed





Impressions: 





 Service Date/Time:  Tuesday, January 2, 2018 03:16 - CONCLUSION:  Blunting of 





 the right costophrenic angle I either related to a small effusion or pleural 





 scarring. Otherwise, no infiltrates.     Clement Torres Jr., MD 








Physical Exam


HEENT: Normocephalic, sclera dry eyes are glassy, icteric


NECK: Neck thin


CHEST:  Low volumes


CARDIAC:  Regular rate 


ABDOMEN:  Abdomen round, taut, soft minimal bowel sounds, tympanic alternating 

with some dull sounds on palpation


EXTREMITIES: No edema.


SKIN: Thin dry turgor, icteric


CNS:  Randomly opens eyes but nonverbal





Assessment and Plan


Assessment:  


(1) Jaundice


ICD Codes:  R17 - Unspecified jaundice


Status:  Acute


Plan


Continue nothing by mouth


Monitor for any acute bleeding, very high risk with elevated INR


Paracentesis is still pending, 


Labs to monitor which includes LFTs, alkaline phosphatase and bilirubin


Palliative care, appreciate input, patient is critically ill and appears to be 

actively dying


GI available as needed


PPI IV





Plan of care will be based on symptom management and toleration of any further 

testing.





Patient was examined by myself and Dr. Marcos, note was done on his behalf











Aliya Zheng [place] 3, 2018 13:25

## 2018-01-03 NOTE — PD.ONC.PN
Subjective


Subjective


Remarks


Non verbal, appears to have some recognition for his wife.


Moves around bed.





Objective


Data











  Date Time  Temp Pulse Resp B/P (MAP) Pulse Ox O2 Delivery O2 Flow Rate FiO2


 


1/3/18 16:39 97.1 101 20 149/76 (100) 97   


 


1/3/18 11:15    147/84 (105)    


 


1/3/18 11:13 97.8 96 18 165/81 (109) 96   


 


1/3/18 09:24  101      


 


1/3/18 09:24 97.4 102 22 141/77 (98) 96   


 


1/3/18 04:28 97.5 104 18 159/79 (105) 95   


 


1/2/18 23:53 97.1 103 16 151/72 (98) 95   


 


1/2/18 20:45 96.0 108 14 148/83 (104) 94   


 


1/2/18 20:30  103      














 1/3/18 1/3/18 1/3/18





 07:00 15:00 23:00


 


Intake Total 630 ml 330 ml 85 ml


 


Output Total 500 ml  


 


Balance 130 ml 330 ml 85 ml








Result Diagram:  


1/3/18 0355                                                                    

            1/3/18 0355





Laboratory Results





Laboratory Tests








Test


  1/2/18


22:30 1/3/18


03:55


 


Urine Color DARK-BROWN  


 


Urine Turbidity HAZY  


 


Urine pH 6.0  


 


Urine Specific Gravity 1.025  


 


Urine Protein TRACE mg/dL  


 


Urine Glucose (UA) NEG mg/dL  


 


Urine Ketones 10 mg/dL  


 


Urine Occult Blood SMALL  


 


Urine Nitrite NEG  


 


Urine Bilirubin LARGE  


 


Urine Urobilinogen


  LESS THAN 2.0


MG/DL 


 


 


Urine Leukocyte Esterase NEG  


 


Urine RBC 1 /hpf  


 


Urine WBC 1 /hpf  


 


Urine Amorphous Sediment RARE  


 


Urine Bacteria RARE /hpf  


 


Urine Hyaline Casts 6 /lpf  


 


Urine Mucus FEW /lpf  


 


Microscopic Urinalysis Comment


  CULT NOT


INDICATED 


 


 


White Blood Count  13.0 TH/MM3 


 


Red Blood Count  3.54 MIL/MM3 


 


Hemoglobin  11.7 GM/DL 


 


Hematocrit  34.2 % 


 


Mean Corpuscular Volume  96.4 FL 


 


Mean Corpuscular Hemoglobin  33.1 PG 


 


Mean Corpuscular Hemoglobin


Concent 


  34.3 % 


 


 


Red Cell Distribution Width  17.4 % 


 


Platelet Count  105 TH/MM3 


 


Mean Platelet Volume  7.4 FL 


 


Neutrophils (%) (Auto)  82.6 % 


 


Lymphocytes (%) (Auto)  10.3 % 


 


Monocytes (%) (Auto)  6.5 % 


 


Eosinophils (%) (Auto)  0.3 % 


 


Basophils (%) (Auto)  0.3 % 


 


Neutrophils # (Auto)  10.7 TH/MM3 


 


Lymphocytes # (Auto)  1.3 TH/MM3 


 


Monocytes # (Auto)  0.9 TH/MM3 


 


Eosinophils # (Auto)  0.0 TH/MM3 


 


Basophils # (Auto)  0.0 TH/MM3 


 


CBC Comment  DIFF FINAL 


 


Differential Comment   


 


Prothrombin Time  48.4 SEC 


 


Prothrombin Time Control  11.0 SEC 


 


Prothromb Time International


Ratio 


  4.8 RATIO 


 


 


Activated Partial


Thromboplast Time 


  55.9 SEC 


 


 


APTT Control  25.7 SEC 


 


Mix PT Normal Plasma Immediate  10.7 SEC 


 


Mix PT Normal Plasma 1 Hour  10.9 SEC 


 


Mix PT Patient/Normal 1:4


Immediate 


  11.4 SEC 


 


 


Mix PT Patient/Normal 1:1


Immediate 


  13.4 SEC 


 


 


Mix PT Patient/Normal 1:1 1


Hr 37c 


  13.1 SEC 


 


 


Mix PT Patient/Normal 4:1


Immediate 


  17.4 SEC 


 


 


Mix PT Patient Pre-Incubation  53.5 SEC 


 


PT Mixing Studies


Interpretation 


   


 


 


Mix PTT Normal Plasma


Immediate 


  24.9 SEC 


 


 


Mix PTT Normal Plasma 1 Hour  25.9 SEC 


 


Mix PTT Patient/Normal 1:4


Immed 


  24.5 SEC 


 


 


Mix PTT Patient/Normal 1:1  26.4 SEC 


 


Mix PTT Patient/Normal 1:1 1


Hr 37c 


  29.4 SEC 


 


 


Mix PTT Patient/Normal 4:1


Immed 


  31.7 SEC 


 


 


Mix PTT Patient Pre-Incubation  54.5 SEC 


 


Circulating Anticoagulant PTT


Intrp 


   


 


 


Coagulation Factor Inhibitor


Screen 


   


 


 


Blood Urea Nitrogen  41 MG/DL 


 


Creatinine  1.31 MG/DL 


 


Random Glucose  117 MG/DL 


 


Total Protein  6.3 GM/DL 


 


Albumin  1.7 GM/DL 


 


Calcium Level  7.6 MG/DL 


 


Phosphorus Level  1.8 MG/DL 


 


Magnesium Level  2.7 MG/DL 


 


Alkaline Phosphatase  145 U/L 


 


Aspartate Amino Transf


(AST/SGOT) 


  103 U/L 


 


 


Alanine Aminotransferase


(ALT/SGPT) 


  46 U/L 


 


 


Total Bilirubin  18.7 MG/DL 


 


Sodium Level  137 MEQ/L 


 


Potassium Level  3.9 MEQ/L 


 


Chloride Level  104 MEQ/L 


 


Carbon Dioxide Level  22.8 MEQ/L 


 


Anion Gap  10 MEQ/L 


 


Estimat Glomerular Filtration


Rate 


  57 ML/MIN 


 


 


Ammonia  57 MCMOL/L 


 


Amylase Level  25 U/L 


 


Lipase  48 U/L 


 


Free Thyroxine  1.49 NG/DL 


 


Thyroid Stimulating Hormone


3rd Gen 


  0.148 uIU/ML 


 








Culture Results





Microbiology








 Date/Time


Source Procedure


Growth Status


 


 


 1/2/18 03:15


Blood Peripheral Aerobic Blood Culture - Preliminary


NO GROWTH IN 1 DAY Resulted


 


 1/2/18 03:15 Anaerobic Blood Culture - Preliminary


Gram Negative [place] Resulted





 1/2/18 03:10


Blood Peripheral Aerobic Blood Culture - Preliminary


NO GROWTH IN 1 DAY Resulted


 


 1/2/18 03:10 Anaerobic Blood Culture - Preliminary


Enterobacteriaceae Family Resulted











Administered Medications








 Medications


  (Trade)  Dose


 Ordered  Sig/Amilcar


 Route


 PRN Reason  Start Time


 Stop Time Status Last Admin


Dose Admin


 


 Sodium Chloride


  (NS Flush)  2 ml  BID


 IV FLUSH


   1/2/18 09:00


    1/3/18 09:29


 


 


 Lactulose


  (Lactulose Liq)  30 ml  QID


 PO


   1/2/18 09:00


    1/3/18 16:34


 


 


 Piperacillin Sod/


 Tazobactam Sod  50 ml @ 


 100 mls/hr  Q6H


 IV


   1/2/18 11:00


    1/3/18 16:34


 


 


 Amlodipine


 Besylate


  (Norvasc)  5 mg  DAILY


 PO


   1/2/18 09:00


    1/3/18 09:29


 


 


 Furosemide


  (Lasix)  20 mg  DAILY


 PO


   1/2/18 09:00


   Future Hold 1/2/18 09:50


 


 


 Oxycodone HCl


  (Roxicodone)  5 mg  Q6H  PRN


 PO


 PAIN  1/2/18 06:00


    1/2/18 21:23


 


 


 Rifaximin


  (Xifaxan)  550 mg  BID


 PO


   1/2/18 09:00


    1/3/18 09:29


 


 


 Vancomycin HCl


 1250 mg/Sodium


 Chloride  262.5 ml @ 


 250 mls/hr  Q18H


 IV


   1/2/18 16:00


    1/3/18 09:28


 


 


 Pantoprazole


 Sodium


  (Protonix Inj)  40 mg  Q24H


 IV PUSH


   1/2/18 16:00


    1/3/18 16:34


 


 


 Clonidine


  (Catapres)  0.1 mg  Q4H  PRN


 PO


 SBP > 160  1/2/18 17:30


    1/2/18 18:14


 


 


 Dextrose/Sodium


 Chloride  1,000 ml @ 


 42 mls/hr  [id number]


 IV


   1/2/18 19:15


    1/2/18 21:25


 








Objective Remarks


GENERAL: Cachectic, jaundiced man, bitemporal wasting.


SKIN: Warm and dry.


HEAD: Normocephalic.


EYES: No scleral icterus. No injection or drainage. 


NECK: Supple, trachea midline. No JVD or lymphadenopathy.


LYMPHATIC: No adenopathy.


CARDIOVASCULAR: Regular rate and rhythm without murmurs. 


RESPIRATORY: Breath sounds equal bilaterally. No accessory muscle use.


GASTROINTESTINAL: Abdomen soft, non-tender, distended with fluid wave no 

guarding.


EXTREMITIES: No cyanosis, or edema. 


MUSCULOSKELETAL: Adequate muscle tone.


NEUROLOGICAL: Awake, but lethargic, confused.  Turns in bed spontaneously.





Assessment/Plan


Problem List:  


(1) Pancreatic cancer


ICD Codes:  C25.9 - Malignant neoplasm of pancreas, unspecified


Status:  Acute


Plan:  Metastatic pancreatic cancer with presentation jaundice.


Metal stent in place.


Progressed on multiple lines chemotherapy, most recently on Keytruda.





Cancer cachexia.


New jaundice and ascites.


GM - bacteremia.


Confusion, coagulopathy and encephalopathy.





Discussed with wife above finding.


Discussed goal to treat potentially reversible condition as GM neg bacteremia, 

elevated ammonia.


Discussed his liver disease with metastasis and potential for his to worsen, 

even through she hopes with all her heart he improves.


Wife agree to make patient no code, she understand underlying incurable 

malignancy, potential for harm in resuscitation out weigh good.





Assessment


51 y/o man with metastatic pancreatic cancer admitted with jaundice, dx gm neg 

bacteremia.


Plan


1. Cont Abx.


2. Change to no code.


3. Monitor response to treatment


4. Vitamin k for coagulopathy, FFP if needed.


5. Will discuss with GI, currently too ill for procedure evaluate stent, did 

not tolerate MRCP, US show no biliary dilatation.











Corinna Alves MD [place] 3, 2018 19:41

## 2018-01-03 NOTE — HHI.HCPN
Reason for visit


   a.  To assist with evaluation and management of symptoms including: weakness

, n/v,   pain, encephalopathy


   b.  To assist medical decision maker(s) with: better understanding of 

current medical conditions; weighing benefits/burdens of medical treatment 

options; making        


        medical treatment decisions.


 (Bernadette Gomez)





Subjective/Interval History


Pt seen to follow up on comfort, goals. 





stable. WBC up 13. Platelets down trending 105. INR 4.8, further coag studies 

pending per oncology. Total bili 18.7 (20.4 yesterday) Ammonia  57.  BC + gram 

neg rods. ERCP ordered for today per GI to further investigate for possible 


obstruction. s/p oncology evaluation, pt known to Dr Alves. Pt w known mets 

to liver, bone. Concern that current acute illness 2/2 to hepatic involvement. 

She notes d/w wife, wife goals remain aggressive at this time. 





Pt seen in room, sleeping soundly, no visitors present. Wife has gone home to 

rest (she yesterday reported poor sleep to me 2/2 caring for the pt).  He 

appears very jaundiced, ill. He does not stir to my exam.  I did not vigorously 

attempt to arouse him as he is reported to require Ativan yesterday evening for 

agitation, he has been confused and he currently appears very calm and restful.


Discussed with covering nurse, left my card with contact number for wife when 

she returns to provide her update.








.


 (Bernadette Gomez)





Advance Directives


Living Will:  Never completed


Health Care Surrogate:  Never completed


Durable Power of :  Never completed


 (Bernadette Gomez)





Objective





Vital Signs








  Date Time  Temp Pulse Resp B/P (MAP) Pulse Ox O2 Delivery O2 Flow Rate FiO2


 


1/3/18 11:15    147/84 (105)    


 


1/3/18 11:13 97.8 96 18 165/81 (109) 96   


 


1/3/18 09:24 97.4 102 22 141/77 (98) 96   


 


1/3/18 04:28 97.5 104 18 159/79 (105) 95   


 


1/2/18 23:53 97.1 103 16 151/72 (98) 95   


 


1/2/18 20:45 96.0 108 14 148/83 (104) 94   


 


1/2/18 20:30  103      


 


1/2/18 15:15 96.4 109 18 171/93 (119) 97   














Intake & Output  


 


 1/3/18 1/3/18





 07:00 19:00


 


Intake Total 630 ml 


 


Output Total 500 ml 


 


Balance 130 ml 


 


  


 


Intake Oral 50 ml 


 


IV Total 580 ml 


 


Output Urine Total 500 ml 








Physical Exam


CONSTITUTIONAL/GENERAL: This is a frail, cachectic ill-appearing patient, 

lethargic/sleeping


TUBES/LINES/DRAINS: Port accessed right subclavian


SKIN: +jaundice, + multiple areas scattered Ecchymoses on upper extremities.  

Dressing over reported skin tear right elbow.  Skin warm and dry.


CARDIOVASCULAR: Regular rate and rhythm without murmur.  Peripheral pulses 

symmetric.


RESPIRATORY/CHEST: Symmetric, unlabored respirations.  On room air.  Clear to 

auscultation. Breath sounds equal bilaterally. 


GASTROINTESTINAL: Abdomen firm, tender with exam, +distended.  Limited 

palpation secondary to distention+ Ascites.  Bowel sounds hypoactive.


GENITOURINARY: Without palpable bladder distension. 


MUSCULOSKELETAL: Extremities without clubbing, cyanosis. + 1 Edema to lower 

legs . No joint tenderness or effusion noted. 


NEUROLOGICAL: Sleeping soundly at time of my exam.  Does not stir to my touch 

or soft verbal.  He appears comfortable in no distress.


PSYCHIATRIC: Sleeping peacefully.  No apparent hallucinations or other 

psychotic thought process.


 (Bernadette Gomez)





Diagnostic Tests


Laboratory





Laboratory Tests








Test


  1/2/18


03:15 1/2/18


05:15 1/2/18


08:19 1/2/18


11:45


 


Prothrombin Time


  39.5 SEC


(9.8-11.6) 


  


  


 


 


Prothromb Time International


Ratio 3.9 RATIO 


  


  


  


 


 


Activated Partial


Thromboplast Time 50.1 SEC


(24.3-30.1) 


  


  


 


 


Blood Urea Nitrogen


  37 MG/DL


(7-18) 


  


  


 


 


Creatinine


  1.23 MG/DL


(0.60-1.30) 


  


  


 


 


Random Glucose


  101 MG/DL


() 


  


  


 


 


Total Protein


  7.4 GM/DL


(6.4-8.2) 


  


  


 


 


Albumin


  2.1 GM/DL


(3.4-5.0) 


  


  


 


 


Calcium Level


  8.0 MG/DL


(8.5-10.1) 


  


  


 


 


Alkaline Phosphatase


  184 U/L


() 


  


  


 


 


Aspartate Amino Transf


(AST/SGOT) 91 U/L (15-37) 


  


  


  


 


 


Alanine Aminotransferase


(ALT/SGPT) 47 U/L (12-78) 


  


  


  


 


 


Total Bilirubin


  21.5 MG/DL


(0.2-1.0) 


  


  


 


 


Sodium Level


  134 MEQ/L


(136-145) 


  


  


 


 


Potassium Level


  4.0 MEQ/L


(3.5-5.1) 


  


  


 


 


Chloride Level


  99 MEQ/L


() 


  


  


 


 


Carbon Dioxide Level


  25.9 MEQ/L


(21.0-32.0) 


  


  


 


 


Anion Gap 9 MEQ/L (5-15)    


 


Estimat Glomerular Filtration


Rate 62 ML/MIN


(>89) 


  


  


 


 


Lactic Acid Level


  6.3 mmol/L


(0.4-2.0) 


  3.8 mmol/L


(0.4-2.0) 5.2 mmol/L


(0.4-2.0)


 


Ammonia


  91 MCMOL/L


(11-32) 


  


  


 


 


Lipase


  46 U/L


() 


  


  


 


 


White Blood Count


  


  7.8 TH/MM3


(4.0-11.0) 


  


 


 


Red Blood Count


  


  3.80 MIL/MM3


(4.50-5.90) 


  


 


 


Hemoglobin


  


  13.1 GM/DL


(13.0-17.0) 


  


 


 


Hematocrit


  


  36.7 %


(39.0-51.0) 


  


 


 


Mean Corpuscular Volume


  


  96.7 FL


(80.0-100.0) 


  


 


 


Mean Corpuscular Hemoglobin


  


  34.4 PG


(27.0-34.0) 


  


 


 


Mean Corpuscular Hemoglobin


Concent 


  35.6 %


(32.0-36.0) 


  


 


 


Red Cell Distribution Width


  


  17.4 %


(11.6-17.2) 


  


 


 


Platelet Count


  


  83 TH/MM3


(150-450) 


  


 


 


Mean Platelet Volume


  


  7.7 FL


(7.0-11.0) 


  


 


 


Neutrophils (%) (Auto)


  


  80.3 %


(16.0-70.0) 


  


 


 


Lymphocytes (%) (Auto)


  


  11.7 %


(9.0-44.0) 


  


 


 


Monocytes (%) (Auto)


  


  7.1 %


(0.0-8.0) 


  


 


 


Eosinophils (%) (Auto)


  


  0.6 %


(0.0-4.0) 


  


 


 


Basophils (%) (Auto)


  


  0.3 %


(0.0-2.0) 


  


 


 


Neutrophils # (Auto)


  


  6.2 TH/MM3


(1.8-7.7) 


  


 


 


Lymphocytes # (Auto)


  


  0.9 TH/MM3


(1.0-4.8) 


  


 


 


Monocytes # (Auto)


  


  0.6 TH/MM3


(0-0.9) 


  


 


 


Eosinophils # (Auto)


  


  0.0 TH/MM3


(0-0.4) 


  


 


 


Basophils # (Auto)


  


  0.0 TH/MM3


(0-0.2) 


  


 


 


CBC Comment  AUTO DIFF   


 


Differential Total Cells


Counted 


  100 


  


  


 


 


Neutrophils % (Manual)  66 % (16-70)   


 


Band Neutrophils %  18 % (0-6)   


 


Lymphocytes %  12 % (9-44)   


 


Monocytes %  3 % (0-8)   


 


Eosinophils %  1 % (0-4)   


 


Neutrophils # (Manual)


  


  6.6 TH/MM3


(1.8-7.7) 


  


 


 


Differential Comment


  


  FINAL DIFF


MANUAL 


  


 


 


Atypical Lymphocytes   % (0-0)   


 


Toxic Vacuolation


  


  PRESENT (NONE


SEEN) 


  


 


 


Platelet Estimate  LOW (NORMAL)   


 


Platelet Morphology Comment


  


  NORMAL


(NORMAL) 


  


 


 


Basophilic Stippling  FAINT (NORMAL)   


 


Test


  1/2/18


14:30 1/2/18


22:30 1/3/18


03:55 


 


 


White Blood Count


  12.4 TH/MM3


(4.0-11.0) 


  13.0 TH/MM3


(4.0-11.0) 


 


 


Red Blood Count


  3.73 MIL/MM3


(4.50-5.90) 


  3.54 MIL/MM3


(4.50-5.90) 


 


 


Hemoglobin


  12.9 GM/DL


(13.0-17.0) 


  11.7 GM/DL


(13.0-17.0) 


 


 


Hematocrit


  35.8 %


(39.0-51.0) 


  34.2 %


(39.0-51.0) 


 


 


Mean Corpuscular Volume


  96.0 FL


(80.0-100.0) 


  96.4 FL


(80.0-100.0) 


 


 


Mean Corpuscular Hemoglobin


  34.6 PG


(27.0-34.0) 


  33.1 PG


(27.0-34.0) 


 


 


Mean Corpuscular Hemoglobin


Concent 36.0 %


(32.0-36.0) 


  34.3 %


(32.0-36.0) 


 


 


Red Cell Distribution Width


  17.8 %


(11.6-17.2) 


  17.4 %


(11.6-17.2) 


 


 


Platelet Count


  140 TH/MM3


(150-450) 


  105 TH/MM3


(150-450) 


 


 


Mean Platelet Volume


  8.2 FL


(7.0-11.0) 


  7.4 FL


(7.0-11.0) 


 


 


Prothrombin Time


  42.2 SEC


(9.8-11.6) 


  48.4 SEC


(9.8-11.6) 


 


 


Prothromb Time International


Ratio 4.2 RATIO 


  


  4.8 RATIO 


  


 


 


Activated Partial


Thromboplast Time 49.9 SEC


(24.3-30.1) 


  55.9 SEC


(24.3-30.1) 


 


 


Total Bilirubin


  20.4 MG/DL


(0.2-1.0) 


  18.7 MG/DL


(0.2-1.0) 


 


 


Direct Bilirubin


  14.2 MG/DL


(0.0-0.2) 


  


  


 


 


Indirect Bilirubin


  6.2 MG/DL


(0.0-0.8) 


  


  


 


 


Lactate Dehydrogenase


  271 U/L


() 


  


  


 


 


Total Protein


  7.1 GM/DL


(6.4-8.2) 


  6.3 GM/DL


(6.4-8.2) 


 


 


Urine Color


  


  DARK-BROWN


(YELLW/STRAW) 


  


 


 


Urine Turbidity  HAZY (CLEAR)   


 


Urine pH  6.0 (5.0-8.5)   


 


Urine Specific Gravity


  


  1.025


(1.002-1.035) 


  


 


 


Urine Protein


  


  TRACE mg/dL


(NEG-TRACE) 


  


 


 


Urine Glucose (UA)


  


  NEG mg/dL


(NEG) 


  


 


 


Urine Ketones  10 mg/dL (NEG)   


 


Urine Occult Blood  SMALL (NEG)   


 


Urine Nitrite  NEG (NEG)   


 


Urine Bilirubin  LARGE (NEG)   


 


Urine Urobilinogen


  


  LESS THAN 2.0


MG/DL (LESS 


  


 


 


Urine Leukocyte Esterase  NEG (NEG)   


 


Urine RBC  1 /hpf (0-3)   


 


Urine WBC  1 /hpf (0-5)   


 


Urine Amorphous Sediment  RARE   


 


Urine Bacteria


  


  RARE /hpf


(NONE) 


  


 


 


Urine Hyaline Casts  6 /lpf (RARE)   


 


Urine Mucus  FEW /lpf (OCC)   


 


Microscopic Urinalysis Comment


  


  CULT NOT


INDICATED 


  


 


 


Neutrophils (%) (Auto)


  


  


  82.6 %


(16.0-70.0) 


 


 


Lymphocytes (%) (Auto)


  


  


  10.3 %


(9.0-44.0) 


 


 


Monocytes (%) (Auto)


  


  


  6.5 %


(0.0-8.0) 


 


 


Eosinophils (%) (Auto)


  


  


  0.3 %


(0.0-4.0) 


 


 


Basophils (%) (Auto)


  


  


  0.3 %


(0.0-2.0) 


 


 


Neutrophils # (Auto)


  


  


  10.7 TH/MM3


(1.8-7.7) 


 


 


Lymphocytes # (Auto)


  


  


  1.3 TH/MM3


(1.0-4.8) 


 


 


Monocytes # (Auto)


  


  


  0.9 TH/MM3


(0-0.9) 


 


 


Eosinophils # (Auto)


  


  


  0.0 TH/MM3


(0-0.4) 


 


 


Basophils # (Auto)


  


  


  0.0 TH/MM3


(0-0.2) 


 


 


CBC Comment   DIFF FINAL  


 


Differential Comment     


 


Prothrombin Time Control   11.0 SEC  


 


APTT Control   25.7 SEC  


 


Mix PT Normal Plasma Immediate   10.7 SEC  


 


Mix PT Normal Plasma 1 Hour   10.9 SEC  


 


Mix PT Patient/Normal 1:4


Immediate 


  


  11.4 SEC


(9.8-11.6) 


 


 


Mix PT Patient/Normal 1:1


Immediate 


  


  13.4 SEC


(9.8-11.6) 


 


 


Mix PT Patient/Normal 1:1 1


Hr 37c 


  


  13.1 SEC


(9.8-11.6) 


 


 


Mix PT Patient/Normal 4:1


Immediate 


  


  17.4 SEC


(9.8-11.6) 


 


 


Mix PT Patient Pre-Incubation


  


  


  53.5 SEC


(9.8-11.6) 


 


 


Mix PTT Normal Plasma


Immediate 


  


  24.9 SEC


(24.3-30.1) 


 


 


Mix PTT Normal Plasma 1 Hour   25.9 SEC  


 


Mix PTT Patient/Normal 1:4


Immed 


  


  24.5 SEC


(24.3-30.1) 


 


 


Mix PTT Patient/Normal 1:1


  


  


  26.4 SEC


(24.3-30.1) 


 


 


Mix PTT Patient/Normal 1:1 1


Hr 37c 


  


  29.4 SEC


(24.3-30.1) 


 


 


Mix PTT Patient/Normal 4:1


Immed 


  


  31.7 SEC


(24.3-30.1) 


 


 


Mix PTT Patient Pre-Incubation


  


  


  54.5 SEC


(24.3-30.1) 


 


 


Coagulation Factor Inhibitor


Screen 


  


   


  


 


 


Blood Urea Nitrogen


  


  


  41 MG/DL


(7-18) 


 


 


Creatinine


  


  


  1.31 MG/DL


(0.60-1.30) 


 


 


Random Glucose


  


  


  117 MG/DL


() 


 


 


Albumin


  


  


  1.7 GM/DL


(3.4-5.0) 


 


 


Calcium Level


  


  


  7.6 MG/DL


(8.5-10.1) 


 


 


Phosphorus Level


  


  


  1.8 MG/DL


(2.5-4.9) 


 


 


Magnesium Level


  


  


  2.7 MG/DL


(1.5-2.5) 


 


 


Alkaline Phosphatase


  


  


  145 U/L


() 


 


 


Aspartate Amino Transf


(AST/SGOT) 


  


  103 U/L


(15-37) 


 


 


Alanine Aminotransferase


(ALT/SGPT) 


  


  46 U/L (12-78) 


  


 


 


Sodium Level


  


  


  137 MEQ/L


(136-145) 


 


 


Potassium Level


  


  


  3.9 MEQ/L


(3.5-5.1) 


 


 


Chloride Level


  


  


  104 MEQ/L


() 


 


 


Carbon Dioxide Level


  


  


  22.8 MEQ/L


(21.0-32.0) 


 


 


Anion Gap


  


  


  10 MEQ/L


(5-15) 


 


 


Estimat Glomerular Filtration


Rate 


  


  57 ML/MIN


(>89) 


 


 


Ammonia


  


  


  57 MCMOL/L


(11-32) 


 


 


Amylase Level


  


  


  25 U/L


() 


 


 


Lipase


  


  


  48 U/L


() 


 


 


Free Thyroxine


  


  


  1.49 NG/DL


(0.76-1.46) 


 


 


Thyroid Stimulating Hormone


3rd Gen 


  


  0.148 uIU/ML


(0.358-3.740) 


 








 (Bernadette Gomez)


Result Diagram:  


1/3/18 0355                                                                    

            1/3/18 0355





Microbiology





Microbiology








 Date/Time


Source Procedure


Growth Status


 


 


 1/2/18 03:15


Blood Peripheral Aerobic Blood Culture - Preliminary


NO GROWTH IN 1 DAY Resulted


 


 1/2/18 03:15 Anaerobic Blood Culture - Preliminary


Gram Negative [place] Resulted





 1/2/18 03:10


Blood Peripheral Aerobic Blood Culture - Preliminary


NO GROWTH IN 1 DAY Resulted


 


 1/2/18 03:10 Anaerobic Blood Culture - Preliminary


Enterobacteriaceae Family Resulted








Imaging





Last Impressions








Gall Bladder Ultrasound 1/2/18 0456 Signed





Impressions: 





 Service Date/Time:  Tuesday, January 2, 2018 07:54 - CONCLUSION:  1. There is 





 diffuse abdominal ascites. 2. The liver appears quite small and heterogeneous 

in 





 echotexture consistent with cirrhosis. The patient has known pancreatic cancer 





 with several small hepatic lesions. These were not seen by the ultrasound. I 

do 





 not see evidence of intrahepatic biliary ductal dilation. The patient has a 





 known biliary stent in place. 3. There is minimal sludge in the dependent 





 portion the gallbladder. There is gallbladder wall thickening however, this is 





 not an unexpected finding with the presence of ascites.     Giorgi Soto MD 


 


Head CT 1/2/18 0301 Signed





Impressions: 





 Service Date/Time:  Tuesday, January 2, 2018 03:23 - CONCLUSION:  No acute 





 disease.       Clement Torres Jr., MD 


 


Chest X-Ray 1/2/18 0301 Signed





Impressions: 





 Service Date/Time:  Tuesday, January 2, 2018 03:16 - CONCLUSION:  Blunting of 





 the right costophrenic angle I either related to a small effusion or pleural 





 scarring. Otherwise, no infiltrates.     Clement Torres Jr., MD 








 (Bernadette Gomez)





Assessment and Plan


Disease Oriented Problem List:  


(1) Pancreatic mass


(2) Primary malignant neoplasm of pancreas with metastasis to other site


(3) Hepatic encephalopathy


(4) Jaundice


(5) Lactic acidosis


(6) Ascites


(7) Hypertension


Symptom Scale:  


(1) Encephalopathy


0-10 Scale:  Unable to quantify





(2) Confusion


0-10 Scale:  Unable to quantify





(3) Malnutrition


0-10 Scale:  Unable to quantify





(4) Pain, abdominal


0-10 Scale:  Unable to quantify





Pertinent Non-Medical Issues


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 

always worked outdoor labor/construction type jobs.   He has not been able to 

work since cancer diagnosis.   to his wife x 18 yrs in April (they have 

 a few times before marriage 18 yrs ago). Pt has 2 children, son and 

daughter, who are adults that live with them to assist w his care. 3 Yo 

grandchild also lives w them. 


Spiritual: No particular Alevism affiliation wife feels he would not want 

 visits


Legal:Pt is unable to participate in decisions due to confusion. Not clear if 

he will regain capacity. No written HCS or advanced directive. Wife would be 

appropriate proxy per [place] statutes. 


Ethical issues impacting care:


Important Contacts


wife Radha Chino [phone]


.


Prognosis


This patient was admitted for weakness, nausea vomiting, confusion and 

decreased oral intake.  He has known history of metastatic pancreatic cancer 

diagnoses August 2016.  He has had disease progression, has not tolerated 

chemotherapy, is status post 1 course of  Keytruda 2-3 weeks ago.  He has had 

significant weight loss, is now having significant liver dysfunction which is 

concerning to be secondary to disease process.  He may not be a candidate for 

further aggressive cancer treatment.  Appropriate for hospice if goals are 

compatible.





.


Code Status:  Full Code


Plan





* Legal decision maker: Pt is unable to participate in decisions due to 

confusion. Not clear if he will regain capacity. No written HCS or advanced 

directive. Wife would be appropriate proxy per [place] statutes. 


   


* Goals: Per initial discussion with wife 1/2/18 Wife is hopeful patient acute 

issues can be stabilized and he can return home to resume treatment for his 

underlying cancer.  She understands treatment is palliative in nature though 

she was hopeful he would get more time with this.  Left my contact information 

for her today at the bedside 1/3/18, per oncology consultation yesterday she 

expressed aggressive goals





* CODE STATUS: full code


   


   SYMPTOMS:


   --weakness - deconditioning, debilitated, progressive weight loss since 

initial cancer diagnosis.  PT/OT ordered, patient chronically debilitated not 

likely to restore to prior levels of function and strength


   --Encephalopathy- hepatic,?  Sepsis, on rifaximin, lactulose, ammonia 91, 

lactic acid 6, at times with episodes of confusion in the home setting 

primarily with hepatic dysfunction episodes or with pain medication occasionally


   --n/v/malnutrition- poor oral intake for the past few days taking only sips 

of fluids.  One episode of emesis prior to admission following administration 

of pain medication.  Prior that progressive weight loss over the past many 

months, and very little oral intake due to decreased appetite not feeling 

hungry.


   --Abdominal pain-unable to rate currently, wife describes ongoing abdominal 

pain and discomfort which worsens with ascites; some slight relief with use of 

prn oxycodone outpatient and paracentesis.  Cautious use of opioids given 

altered mental status and risk for decline; has been ordered for oxycodone 5 mg 

every 6 hours prn-- monitor requirements/effectiveness





   Palliative care will continue to follow during hospital course as condition 

evolves, to assist patient/decision-maker with understanding of medical 

conditions, weighing benefits/burdens of treatment options, for clarification 

of goals of treatment.  Additionally will assist with any symptoms of 

palliative concern





 (Bernadette Gomez)





Time Spent


Total Floor Time (mins):  15 (Chart review, PE, discussion with nurse)


 (Bernadette Gomez)





Attestation


To help prompt me to consider important information that might be impacting 

today's encounter and assessment, information from prior notes written by 

myself or my colleagues may have been "brought forward" into today's note.  My 

signature on this note, however, is an attestation that I personally performed 

the exam, history, and/or decision-making noted today, and, unless otherwise 

indicated, the interactions with patient, family, and staff as well as the 

review of records all occurred today.  I also attest that the listed assessment 

and stated plan reflect my best clinical judgment today based on the 

combination of historical information, prior notes, and today's exam/ 

interactions.  When time spent is documented, it refers only to time spent 

today by the signer, or if indicated, combined time spent today by 

collaborating physician/nurse practitioner.


 (Bernadette Gomez)


Collaborating MD Comments


Chart reviewed.  Case discussed with palliative care ARNP.  Above ARNP note 

reviewed and I concur.


.


 (James Jordan MD)











Bernadette Gomez Gilbert 3, 2018 12:24


James Jordan MD Jan 9, 2018 14:55

## 2018-01-04 VITALS
SYSTOLIC BLOOD PRESSURE: 146 MMHG | HEART RATE: 100 BPM | RESPIRATION RATE: 18 BRPM | DIASTOLIC BLOOD PRESSURE: 82 MMHG | TEMPERATURE: 97 F | OXYGEN SATURATION: 96 %

## 2018-01-04 VITALS
HEART RATE: 100 BPM | DIASTOLIC BLOOD PRESSURE: 88 MMHG | RESPIRATION RATE: 14 BRPM | SYSTOLIC BLOOD PRESSURE: 168 MMHG | OXYGEN SATURATION: 96 % | TEMPERATURE: 98.6 F

## 2018-01-04 VITALS
SYSTOLIC BLOOD PRESSURE: 165 MMHG | OXYGEN SATURATION: 96 % | HEART RATE: 87 BPM | RESPIRATION RATE: 16 BRPM | DIASTOLIC BLOOD PRESSURE: 79 MMHG | TEMPERATURE: 98 F

## 2018-01-04 VITALS
DIASTOLIC BLOOD PRESSURE: 80 MMHG | HEART RATE: 86 BPM | SYSTOLIC BLOOD PRESSURE: 145 MMHG | RESPIRATION RATE: 15 BRPM | OXYGEN SATURATION: 96 %

## 2018-01-04 VITALS — HEART RATE: 84 BPM

## 2018-01-04 VITALS
OXYGEN SATURATION: 93 % | RESPIRATION RATE: 18 BRPM | DIASTOLIC BLOOD PRESSURE: 79 MMHG | SYSTOLIC BLOOD PRESSURE: 143 MMHG | HEART RATE: 103 BPM

## 2018-01-04 VITALS — HEART RATE: 100 BPM

## 2018-01-04 RX ADMIN — SODIUM CHLORIDE SCH MLS/HR: 900 INJECTION INTRAVENOUS at 03:26

## 2018-01-04 RX ADMIN — SODIUM BICARBONATE SCH MLS/HR: 84 INJECTION, SOLUTION INTRAVENOUS at 00:27

## 2018-01-04 RX ADMIN — WATER SCH ML: 1 IRRIGANT IRRIGATION at 22:36

## 2018-01-04 RX ADMIN — TAZOBACTAM SODIUM AND PIPERACILLIN SODIUM SCH MLS/HR: 375; 3 INJECTION, SOLUTION INTRAVENOUS at 17:11

## 2018-01-04 RX ADMIN — STANDARDIZED SENNA CONCENTRATE AND DOCUSATE SODIUM SCH TAB: 8.6; 5 TABLET, FILM COATED ORAL at 08:22

## 2018-01-04 RX ADMIN — STANDARDIZED SENNA CONCENTRATE AND DOCUSATE SODIUM SCH TAB: 8.6; 5 TABLET, FILM COATED ORAL at 21:00

## 2018-01-04 RX ADMIN — Medication SCH ML: at 08:22

## 2018-01-04 RX ADMIN — WATER SCH ML: 1 IRRIGANT IRRIGATION at 08:16

## 2018-01-04 RX ADMIN — RIFAXIMIN SCH MG: 550 TABLET ORAL at 08:16

## 2018-01-04 RX ADMIN — WATER SCH ML: 1 IRRIGANT IRRIGATION at 17:11

## 2018-01-04 RX ADMIN — PANTOPRAZOLE SODIUM SCH MG: 40 INJECTION, POWDER, FOR SOLUTION INTRAVENOUS at 17:10

## 2018-01-04 RX ADMIN — TAZOBACTAM SODIUM AND PIPERACILLIN SODIUM SCH MLS/HR: 375; 3 INJECTION, SOLUTION INTRAVENOUS at 04:52

## 2018-01-04 RX ADMIN — PHYTONADIONE SCH MG: 10 INJECTION, EMULSION INTRAMUSCULAR; INTRAVENOUS; SUBCUTANEOUS at 11:21

## 2018-01-04 RX ADMIN — Medication SCH ML: at 22:38

## 2018-01-04 RX ADMIN — TAZOBACTAM SODIUM AND PIPERACILLIN SODIUM SCH MLS/HR: 375; 3 INJECTION, SOLUTION INTRAVENOUS at 11:22

## 2018-01-04 RX ADMIN — WATER SCH ML: 1 IRRIGANT IRRIGATION at 12:51

## 2018-01-04 RX ADMIN — RIFAXIMIN SCH MG: 550 TABLET ORAL at 22:35

## 2018-01-04 RX ADMIN — TAZOBACTAM SODIUM AND PIPERACILLIN SODIUM SCH MLS/HR: 375; 3 INJECTION, SOLUTION INTRAVENOUS at 22:42

## 2018-01-04 NOTE — HHI.HCPN
Reason for visit


   a.  To assist with evaluation and management of symptoms including: weakness

, n/v,   pain, encephalopathy


   b.  To assist medical decision maker(s) with: better understanding of 

current medical conditions; weighing benefits/burdens of medical treatment 

options; making        


        medical treatment decisions.





Subjective/Interval History


Pt seen to follow up on comfort, goals. 





stable, neuro status improving slightly. No new labs or imaging today. ERCP 

held yesterday due to pt unable to tolerate being flat for.   BC + gram neg 

rods. On vanco, zosyn. Oncology notes discussion w wife RE conditions/prognosis

, appears wife remains hopeful/ goals remain aggressive at this time. 





[dual visit w Anna GUTHRIE]


Pt seen in room, alert. Rn present for part of exam. Pt tracks examiner. Does 

follow some simple commands. Minimally verbal - does not answer my yes/no 

questions. will not state name, does not name wife. Asks for water. Calm, 

restful. Smiles at times. When asked how he is feeling he does say "terrible" , 

asked if he is hurting he replies "all over"- he is unable to further qualify. 

He is very tender w light touch to abdomen. D/w w nursing , she will admin prn 

oxycodone for pain. 





Following exam call to pt wife. Person answering indicates wife is en route to 

hospital, will try to meet w her at bedside. 


.


Family/friend interactions


1400- later return to unit to meet w pt wife. Pt alert, seated on side of bed, 

nursing assisting w linen change. He is still confused though seems to 

recognize his wife. Asks for water. 


Met with wife at bedside, gently explore her recent discussions with oncology. 


 She indicates they had some discussion that some of this could be his liver 

worsening, could be the cancer metastasis, though they would continue to 

aggressively treat infectious process, ascites and what other other acute 

issues that he might be able to resolve this admission.


Gently explore with her that we may be able to continue to treat acute infection

, and continue ongoing diagnostics and corrective treatments however even with 

these interventions disease progression would continue and at some point he 

will continue to experience decline secondary to underlying pancreatic cancer 

and significant liver dysfunction/failure.  Gently explore that this could be 

in a matter of days to weeks, explore with her options if patient does continue 

to decline.  He asked questions about home health.  I explored the differences 

of home health versus hospice--and home health focus on curative/restorative, 

and that home health unlikely to meet patient ongoing needs.  Further explore 

hospice roll, philosophy.  She is tearful at times, indicates that the patient 

has always expressed that he did not want to die in a hospital setting but 

wanted to be at his own home.  Gently explore with her that as his disease 

disease progresses and if he continues to seek hospitalization it will become 

more likely that he may in fact die during acute hospitalization.  CXR with her 

hospice option services provided, and that they would attempt to assist with 

keeping him in place and managing his care in the home setting if at all 

possible.  All questions answered, supportive listening provided.  She 

indicates she wishes to take things one day at a time, I sense she is still 

struggling to accept that the patient is declining, and he is nearing end of 

his disease process.  She does elaborate a further discussed CODE STATUS in 

that she knows he would not want resuscitation or machines and hence she 

elected DNR status.  For now otherwise goals are aggressive to continue 

aggressive treatment course with the goal of getting patient back home, she is 

open to ongoing discussions.





Advance Directives


Living Will:  Never completed


Health Care Surrogate:  Never completed


Durable Power of :  Never completed





Objective





Vital Signs








  Date Time  Temp Pulse Resp B/P (MAP) Pulse Ox O2 Delivery O2 Flow Rate FiO2


 


1/4/18 11:27  103 18 143/79 (100) 93   


 


1/4/18 07:49 97.0 100 18 146/82 (103) 96   


 


1/4/18 04:00  100      


 


1/4/18 03:30 98.6 100 14 168/88 (114) 96   


 


1/3/18 23:49 97.6 99 12 160/87 (111) 96   


 


1/3/18 20:30 98.7 102 14 165/79 (107) 96   


 


1/3/18 20:30  92      


 


1/3/18 16:39 97.1 101 20 149/76 (100) 97   














Intake & Output  


 


 1/4/18 1/4/18





 07:00 19:00


 


Intake Total 1462.5 ml 


 


Balance 1462.5 ml 


 


  


 


Intake Oral 100 ml 


 


IV Total 1362.5 ml 


 


# Voids 3 


 


# Bowel Movements 3 








Physical Exam


CONSTITUTIONAL/GENERAL: This is a frail, cachectic ill-appearing patient


TUBES/LINES/DRAINS: Port accessed right subclavian


SKIN: +jaundice, + multiple areas scattered Ecchymoses on upper extremities.  

Dressing over reported skin tear right elbow.  Skin warm and dry.


EYES: Pupils 4 mm, sluggish reaction to light.  Extraocular motions intact. +

scleral icterus. No injection or drainage. Fundi not examined.


ENT:  Nose without bleeding or purulent drainage.  Does not open mouth for me 

to visualize oropharynx.+ Poor dentition/caries  


CARDIOVASCULAR: Regular rate and rhythm without murmur. No JVD. Peripheral 

pulses symmetric.


RESPIRATORY/CHEST: Symmetric, unlabored respirations.  On room air.  Clear to 

auscultation. Breath sounds equal bilaterally. 


GASTROINTESTINAL: Abdomen firm,very tender with exam, +distended.  limited 

palpation secondary to grimacing/pain.+ Ascites.  Bowel sounds normoactive.


MUSCULOSKELETAL: Extremities without clubbing, cyanosis. trace edema to ankles 

. No joint tenderness or effusion noted. 


NEUROLOGICAL: Awake and alert. minimally verbal.  Appears confused.  

Intermittently follows simple commands.  Observe to move all 4 extremities 

moving self around in bed  


PSYCHIATRIC: no apparent anxiety.  No apparent hallucinations or other 

psychotic thought process.





Diagnostic Tests


Laboratory





Laboratory Tests








Test


  1/2/18


03:15 1/2/18


05:15 1/2/18


08:19 1/2/18


11:45


 


Prothrombin Time


  39.5 SEC


(9.8-11.6) 


  


  


 


 


Prothromb Time International


Ratio 3.9 RATIO 


  


  


  


 


 


Activated Partial


Thromboplast Time 50.1 SEC


(24.3-30.1) 


  


  


 


 


Blood Urea Nitrogen


  37 MG/DL


(7-18) 


  


  


 


 


Creatinine


  1.23 MG/DL


(0.60-1.30) 


  


  


 


 


Random Glucose


  101 MG/DL


() 


  


  


 


 


Total Protein


  7.4 GM/DL


(6.4-8.2) 


  


  


 


 


Albumin


  2.1 GM/DL


(3.4-5.0) 


  


  


 


 


Calcium Level


  8.0 MG/DL


(8.5-10.1) 


  


  


 


 


Alkaline Phosphatase


  184 U/L


() 


  


  


 


 


Aspartate Amino Transf


(AST/SGOT) 91 U/L (15-37) 


  


  


  


 


 


Alanine Aminotransferase


(ALT/SGPT) 47 U/L (12-78) 


  


  


  


 


 


Total Bilirubin


  21.5 MG/DL


(0.2-1.0) 


  


  


 


 


Sodium Level


  134 MEQ/L


(136-145) 


  


  


 


 


Potassium Level


  4.0 MEQ/L


(3.5-5.1) 


  


  


 


 


Chloride Level


  99 MEQ/L


() 


  


  


 


 


Carbon Dioxide Level


  25.9 MEQ/L


(21.0-32.0) 


  


  


 


 


Anion Gap 9 MEQ/L (5-15)    


 


Estimat Glomerular Filtration


Rate 62 ML/MIN


(>89) 


  


  


 


 


Lactic Acid Level


  6.3 mmol/L


(0.4-2.0) 


  3.8 mmol/L


(0.4-2.0) 5.2 mmol/L


(0.4-2.0)


 


Ammonia


  91 MCMOL/L


(11-32) 


  


  


 


 


Lipase


  46 U/L


() 


  


  


 


 


White Blood Count


  


  7.8 TH/MM3


(4.0-11.0) 


  


 


 


Red Blood Count


  


  3.80 MIL/MM3


(4.50-5.90) 


  


 


 


Hemoglobin


  


  13.1 GM/DL


(13.0-17.0) 


  


 


 


Hematocrit


  


  36.7 %


(39.0-51.0) 


  


 


 


Mean Corpuscular Volume


  


  96.7 FL


(80.0-100.0) 


  


 


 


Mean Corpuscular Hemoglobin


  


  34.4 PG


(27.0-34.0) 


  


 


 


Mean Corpuscular Hemoglobin


Concent 


  35.6 %


(32.0-36.0) 


  


 


 


Red Cell Distribution Width


  


  17.4 %


(11.6-17.2) 


  


 


 


Platelet Count


  


  83 TH/MM3


(150-450) 


  


 


 


Mean Platelet Volume


  


  7.7 FL


(7.0-11.0) 


  


 


 


Neutrophils (%) (Auto)


  


  80.3 %


(16.0-70.0) 


  


 


 


Lymphocytes (%) (Auto)


  


  11.7 %


(9.0-44.0) 


  


 


 


Monocytes (%) (Auto)


  


  7.1 %


(0.0-8.0) 


  


 


 


Eosinophils (%) (Auto)


  


  0.6 %


(0.0-4.0) 


  


 


 


Basophils (%) (Auto)


  


  0.3 %


(0.0-2.0) 


  


 


 


Neutrophils # (Auto)


  


  6.2 TH/MM3


(1.8-7.7) 


  


 


 


Lymphocytes # (Auto)


  


  0.9 TH/MM3


(1.0-4.8) 


  


 


 


Monocytes # (Auto)


  


  0.6 TH/MM3


(0-0.9) 


  


 


 


Eosinophils # (Auto)


  


  0.0 TH/MM3


(0-0.4) 


  


 


 


Basophils # (Auto)


  


  0.0 TH/MM3


(0-0.2) 


  


 


 


CBC Comment  AUTO DIFF   


 


Differential Total Cells


Counted 


  100 


  


  


 


 


Neutrophils % (Manual)  66 % (16-70)   


 


Band Neutrophils %  18 % (0-6)   


 


Lymphocytes %  12 % (9-44)   


 


Monocytes %  3 % (0-8)   


 


Eosinophils %  1 % (0-4)   


 


Neutrophils # (Manual)


  


  6.6 TH/MM3


(1.8-7.7) 


  


 


 


Differential Comment


  


  FINAL DIFF


MANUAL 


  


 


 


Atypical Lymphocytes   % (0-0)   


 


Toxic Vacuolation


  


  PRESENT (NONE


SEEN) 


  


 


 


Platelet Estimate  LOW (NORMAL)   


 


Platelet Morphology Comment


  


  NORMAL


(NORMAL) 


  


 


 


Basophilic Stippling  FAINT (NORMAL)   


 


Test


  1/2/18


14:30 1/2/18


22:30 1/3/18


03:55 1/4/18


03:20


 


White Blood Count


  12.4 TH/MM3


(4.0-11.0) 


  13.0 TH/MM3


(4.0-11.0) 


 


 


Red Blood Count


  3.73 MIL/MM3


(4.50-5.90) 


  3.54 MIL/MM3


(4.50-5.90) 


 


 


Hemoglobin


  12.9 GM/DL


(13.0-17.0) 


  11.7 GM/DL


(13.0-17.0) 


 


 


Hematocrit


  35.8 %


(39.0-51.0) 


  34.2 %


(39.0-51.0) 


 


 


Mean Corpuscular Volume


  96.0 FL


(80.0-100.0) 


  96.4 FL


(80.0-100.0) 


 


 


Mean Corpuscular Hemoglobin


  34.6 PG


(27.0-34.0) 


  33.1 PG


(27.0-34.0) 


 


 


Mean Corpuscular Hemoglobin


Concent 36.0 %


(32.0-36.0) 


  34.3 %


(32.0-36.0) 


 


 


Red Cell Distribution Width


  17.8 %


(11.6-17.2) 


  17.4 %


(11.6-17.2) 


 


 


Platelet Count


  140 TH/MM3


(150-450) 


  105 TH/MM3


(150-450) 


 


 


Mean Platelet Volume


  8.2 FL


(7.0-11.0) 


  7.4 FL


(7.0-11.0) 


 


 


Prothrombin Time


  42.2 SEC


(9.8-11.6) 


  48.4 SEC


(9.8-11.6) 


 


 


Prothromb Time International


Ratio 4.2 RATIO 


  


  4.8 RATIO 


  


 


 


Activated Partial


Thromboplast Time 49.9 SEC


(24.3-30.1) 


  55.9 SEC


(24.3-30.1) 


 


 


Total Bilirubin


  20.4 MG/DL


(0.2-1.0) 


  18.7 MG/DL


(0.2-1.0) 


 


 


Direct Bilirubin


  14.2 MG/DL


(0.0-0.2) 


  


  


 


 


Indirect Bilirubin


  6.2 MG/DL


(0.0-0.8) 


  


  


 


 


Lactate Dehydrogenase


  271 U/L


() 


  


  


 


 


Total Protein


  7.1 GM/DL


(6.4-8.2) 


  6.3 GM/DL


(6.4-8.2) 


 


 


Urine Color


  


  DARK-BROWN


(YELLW/STRAW) 


  


 


 


Urine Turbidity  HAZY (CLEAR)   


 


Urine pH  6.0 (5.0-8.5)   


 


Urine Specific Gravity


  


  1.025


(1.002-1.035) 


  


 


 


Urine Protein


  


  TRACE mg/dL


(NEG-TRACE) 


  


 


 


Urine Glucose (UA)


  


  NEG mg/dL


(NEG) 


  


 


 


Urine Ketones  10 mg/dL (NEG)   


 


Urine Occult Blood  SMALL (NEG)   


 


Urine Nitrite  NEG (NEG)   


 


Urine Bilirubin  LARGE (NEG)   


 


Urine Urobilinogen


  


  LESS THAN 2.0


MG/DL (LESS 


  


 


 


Urine Leukocyte Esterase  NEG (NEG)   


 


Urine RBC  1 /hpf (0-3)   


 


Urine WBC  1 /hpf (0-5)   


 


Urine Amorphous Sediment  RARE   


 


Urine Bacteria


  


  RARE /hpf


(NONE) 


  


 


 


Urine Hyaline Casts  6 /lpf (RARE)   


 


Urine Mucus  FEW /lpf (OCC)   


 


Microscopic Urinalysis Comment


  


  CULT NOT


INDICATED 


  


 


 


Neutrophils (%) (Auto)


  


  


  82.6 %


(16.0-70.0) 


 


 


Lymphocytes (%) (Auto)


  


  


  10.3 %


(9.0-44.0) 


 


 


Monocytes (%) (Auto)


  


  


  6.5 %


(0.0-8.0) 


 


 


Eosinophils (%) (Auto)


  


  


  0.3 %


(0.0-4.0) 


 


 


Basophils (%) (Auto)


  


  


  0.3 %


(0.0-2.0) 


 


 


Neutrophils # (Auto)


  


  


  10.7 TH/MM3


(1.8-7.7) 


 


 


Lymphocytes # (Auto)


  


  


  1.3 TH/MM3


(1.0-4.8) 


 


 


Monocytes # (Auto)


  


  


  0.9 TH/MM3


(0-0.9) 


 


 


Eosinophils # (Auto)


  


  


  0.0 TH/MM3


(0-0.4) 


 


 


Basophils # (Auto)


  


  


  0.0 TH/MM3


(0-0.2) 


 


 


CBC Comment   DIFF FINAL  


 


Differential Comment     


 


Prothrombin Time Control   11.0 SEC  


 


APTT Control   25.7 SEC  


 


Mix PT Normal Plasma Immediate   10.7 SEC  


 


Mix PT Normal Plasma 1 Hour   10.9 SEC  


 


Mix PT Patient/Normal 1:4


Immediate 


  


  11.4 SEC


(9.8-11.6) 


 


 


Mix PT Patient/Normal 1:1


Immediate 


  


  13.4 SEC


(9.8-11.6) 


 


 


Mix PT Patient/Normal 1:1 1


Hr 37c 


  


  13.1 SEC


(9.8-11.6) 


 


 


Mix PT Patient/Normal 4:1


Immediate 


  


  17.4 SEC


(9.8-11.6) 


 


 


Mix PT Patient Pre-Incubation


  


  


  53.5 SEC


(9.8-11.6) 


 


 


PT Mixing Studies


Interpretation 


  


   


  


 


 


Mix PTT Normal Plasma


Immediate 


  


  24.9 SEC


(24.3-30.1) 


 


 


Mix PTT Normal Plasma 1 Hour   25.9 SEC  


 


Mix PTT Patient/Normal 1:4


Immed 


  


  24.5 SEC


(24.3-30.1) 


 


 


Mix PTT Patient/Normal 1:1


  


  


  26.4 SEC


(24.3-30.1) 


 


 


Mix PTT Patient/Normal 1:1 1


Hr 37c 


  


  29.4 SEC


(24.3-30.1) 


 


 


Mix PTT Patient/Normal 4:1


Immed 


  


  31.7 SEC


(24.3-30.1) 


 


 


Mix PTT Patient Pre-Incubation


  


  


  54.5 SEC


(24.3-30.1) 


 


 


Circulating Anticoagulant PTT


Intrp 


  


   


  


 


 


Coagulation Factor Inhibitor


Screen 


  


   


  


 


 


Blood Urea Nitrogen


  


  


  41 MG/DL


(7-18) 


 


 


Creatinine


  


  


  1.31 MG/DL


(0.60-1.30) 


 


 


Random Glucose


  


  


  117 MG/DL


() 


 


 


Albumin


  


  


  1.7 GM/DL


(3.4-5.0) 


 


 


Calcium Level


  


  


  7.6 MG/DL


(8.5-10.1) 


 


 


Phosphorus Level


  


  


  1.8 MG/DL


(2.5-4.9) 


 


 


Magnesium Level


  


  


  2.7 MG/DL


(1.5-2.5) 


 


 


Alkaline Phosphatase


  


  


  145 U/L


() 


 


 


Aspartate Amino Transf


(AST/SGOT) 


  


  103 U/L


(15-37) 


 


 


Alanine Aminotransferase


(ALT/SGPT) 


  


  46 U/L (12-78) 


  


 


 


Sodium Level


  


  


  137 MEQ/L


(136-145) 


 


 


Potassium Level


  


  


  3.9 MEQ/L


(3.5-5.1) 


 


 


Chloride Level


  


  


  104 MEQ/L


() 


 


 


Carbon Dioxide Level


  


  


  22.8 MEQ/L


(21.0-32.0) 


 


 


Anion Gap


  


  


  10 MEQ/L


(5-15) 


 


 


Estimat Glomerular Filtration


Rate 


  


  57 ML/MIN


(>89) 


 


 


Hemoglobin A1c


  


  


  4.6 %


(4.3-6.0) 


 


 


Ammonia


  


  


  57 MCMOL/L


(11-32) 


 


 


Amylase Level


  


  


  25 U/L


() 


 


 


Lipase


  


  


  48 U/L


() 


 


 


Free Thyroxine


  


  


  1.49 NG/DL


(0.76-1.46) 


 


 


Thyroid Stimulating Hormone


3rd Gen 


  


  0.148 uIU/ML


(0.358-3.740) 


 


 


Vancomycin Level Trough


  


  


  


  19.0 MCG/ML


(5.0-10.0)








Result Diagram:  


1/3/18 0355                                                                    

            1/3/18 0355





Microbiology





Microbiology








 Date/Time


Source Procedure


Growth Status


 


 


 1/2/18 03:15


Blood Peripheral Aerobic Blood Culture - Preliminary


NO GROWTH IN 2 DAYS Resulted


 


 1/2/18 03:15 Anaerobic Blood Culture - Preliminary


Gram Negative [place] Resulted





 1/2/18 03:10


Blood Peripheral Aerobic Blood Culture - Preliminary


NO GROWTH IN 2 DAYS Resulted


 


 1/2/18 03:10 Anaerobic Blood Culture - Preliminary


Enterobacteriaceae Family Resulted








Imaging





Last Impressions








Gall Bladder Ultrasound 1/2/18 0453 Signed





Impressions: 





 Service Date/Time:  Tuesday, January 2, 2018 07:54 - CONCLUSION:  1. There is 





 diffuse abdominal ascites. 2. The liver appears quite small and heterogeneous 

in 





 echotexture consistent with cirrhosis. The patient has known pancreatic cancer 





 with several small hepatic lesions. These were not seen by the ultrasound. I 

do 





 not see evidence of intrahepatic biliary ductal dilation. The patient has a 





 known biliary stent in place. 3. There is minimal sludge in the dependent 





 portion the gallbladder. There is gallbladder wall thickening however, this is 





 not an unexpected finding with the presence of ascites.     Giorgi Soto MD 


 


Head CT 1/2/18 0301 Signed





Impressions: 





 Service Date/Time:  Tuesday, January 2, 2018 03:23 - CONCLUSION:  No acute 





 disease.       Clement Torres Jr., MD 


 


Chest X-Ray 1/2/18 0301 Signed





Impressions: 





 Service Date/Time:  Tuesday, January 2, 2018 03:16 - CONCLUSION:  Blunting of 





 the right costophrenic angle I either related to a small effusion or pleural 





 scarring. Otherwise, no infiltrates.     Clement Torres Jr., MD 











Assessment and Plan


Disease Oriented Problem List:  


(1) Pancreatic mass


(2) Primary malignant neoplasm of pancreas with metastasis to other site


(3) Hepatic encephalopathy


(4) Jaundice


(5) Lactic acidosis


(6) Ascites


(7) Hypertension


Symptom Scale:  


(1) Encephalopathy


0-10 Scale:  Unable to quantify





(2) Confusion


0-10 Scale:  Unable to quantify





(3) Malnutrition


0-10 Scale:  Unable to quantify





(4) Pain, abdominal


0-10 Scale:  Unable to quantify





Pertinent Non-Medical Issues


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 

always worked outdoor labor/construction type jobs.   He has not been able to 

work since cancer diagnosis.   to his wife x 18 yrs in April (they have 

 a few times before marriage 18 yrs ago). Pt has 2 children, son and 

daughter, who are adults that live with them to assist w his care. 3 Yo 

grandchild also lives w them. 


Spiritual: No particular [employer] affiliation wife feels he would not want 

 visits


Legal:Pt is unable to participate in decisions due to confusion. Not clear if 

he will regain capacity. No written HCS or advanced directive. Wife would be 

appropriate proxy per [place] statutes. 


Ethical issues impacting care:


Important Contacts


wife Radha Chino [phone]


.


Prognosis





This patient was admitted for weakness, nausea vomiting, confusion and 

decreased oral intake.  He has known history of metastatic pancreatic cancer 

diagnoses August 2016.  He has had disease progression, has not tolerated 

chemotherapy, is status post 1 course of  Keytruda 2-3 weeks ago.  He has had 

significant weight loss, is now having significant liver dysfunction which is 

concerning to be secondary to disease process.  He may not be a candidate for 

further aggressive cancer treatment.  Appropriate for hospice if goals are 

compatible.


.


Code Status:  Full Code


Plan





* Legal decision maker: Pt is unable to participate in decisions due to 

confusion. Not clear if he will regain capacity. No written HCS or advanced 

directive. Wife would be appropriate proxy per [place] statutes. 


   


* Goals: wife has been expressing aggressive goals.  Met again with patient 

wife today at length 1/4/18; she is struggling with patient decline in 

terminology.  She is not ready for hospice yet, though we did discuss this at 

length.  She affirms DNR status.  For now goals aggressive she would like boost 

nutritional supplement added.





* CODE STATUS: DNR


   


   SYMPTOMS:


   --weakness - deconditioning, debilitated, progressive weight loss since 

initial cancer diagnosis.  PT/OT ordered, patient chronically debilitated not 

likely to restore to prior levels of function and strength


   --Encephalopathy- hepatic,?  Sepsis, on rifaximin, lactulose, ammonia 91, 

lactic acid 6, at times with episodes of confusion in the home setting 

primarily with hepatic dysfunction episodes or with pain medication occasionally


   --n/v/malnutrition- poor oral intake for the past few days taking only sips 

of fluids.  One episode of emesis prior to admission following administration 

of pain medication.  Prior that progressive weight loss over the past many 

months, and very little oral intake due to decreased appetite not feeling 

hungry.


   --Abdominal pain-unable to rate currently, wife describes ongoing abdominal 

pain and discomfort which worsens with ascites; some slight relief with use of 

prn oxycodone outpatient and paracentesis.  Cautious use of opioids given 

altered mental status and risk for decline; has been ordered for oxycodone 5 mg 

every 6 hours prn-- monitor requirements/effectiveness- d/w nursing today, pt c/

o pain "all over"





   Palliative care will continue to follow during hospital course as condition 

evolves, to assist patient/decision-maker with understanding of medical 

conditions, weighing benefits/burdens of treatment options, for clarification 

of goals of treatment.  Additionally will assist with any symptoms of 

palliative concern





Time Spent


Total Floor Time (mins):  45 (Chart review, PE, discussion with nursing, 

discussion with attending, meeting with wife at bedside)





Attestation


To help prompt me to consider important information that might be impacting 

today's encounter and assessment, information from prior notes written by 

myself or my colleagues may have been "brought forward" into today's note.  My 

signature on this note, however, is an attestation that I personally performed 

the exam, history, and/or decision-making noted today, and, unless otherwise 

indicated, the interactions with patient, family, and staff as well as the 

review of records all occurred today.  I also attest that the listed assessment 

and stated plan reflect my best clinical judgment today based on the 

combination of historical information, prior notes, and today's exam/ 

interactions.  When time spent is documented, it refers only to time spent 

today by the signer, or if indicated, combined time spent today by 

collaborating physician/nurse practitioner.











Bernadette Gomez Jan 4, 2018 13:20

## 2018-01-04 NOTE — HHI.PR
Subjective


Remarks


no change in clinical picture, very ill-appearing and poor prognosis


changed to no code status today





Objective


Vitals





Vital Signs








  Date Time  Temp Pulse Resp B/P (MAP) Pulse Ox O2 Delivery O2 Flow Rate FiO2


 


1/4/18 11:27  103 18 143/79 (100) 93   


 


1/4/18 07:49 97.0 100 18 146/82 (103) 96   


 


1/4/18 07:00  100      


 


1/4/18 04:00  100      


 


1/4/18 03:30 98.6 100 14 168/88 (114) 96   


 


1/3/18 23:49 97.6 99 12 160/87 (111) 96   


 


1/3/18 20:30 98.7 102 14 165/79 (107) 96   


 


1/3/18 20:30  92      


 


1/3/18 16:39 97.1 101 20 149/76 (100) 97   














I/O      


 


 1/3/18 1/3/18 1/3/18 1/4/18 1/4/18 1/4/18





 07:00 15:00 23:00 07:00 15:00 23:00


 


Intake Total 630 ml 330 ml 85 ml 1462.5 ml  


 


Output Total 500 ml     


 


Balance 130 ml 330 ml 85 ml 1462.5 ml  


 


      


 


Intake Oral 50 ml  30 ml 100 ml  


 


IV Total 580 ml 330 ml 55 ml 1362.5 ml  


 


Output Urine Total 500 ml     


 


# Voids   5 3  


 


# Bowel Movements   5 3  








Result Diagram:  


1/3/18 0355                                                                    

            1/3/18 0355





Objective Remarks


__GENERAL: This is frail elderly patient, lethargic


CARDIOVASCULAR: Regular rate and rhythm without murmurs, gallops, or rubs. 


RESPIRATORY: fair air entry  


GASTROINTESTINAL: Abdomen soft,positive distention and ascites


MUSCULOSKELETAL: Extremities without clubbing, cyanosis, or edema.


NEURO:  lethargic





A/P


Assessment and Plan





1/3/18: Blood culture came back gram-negative rods, increased lactic acid, 

palliative care consulted


1/4/18: status change to DNR, continue following with palliative care, GI


A/P:


1.  Pancreatic cancer/AMS/Jaundice/bilirubinemia/hepatic encephalopathy


T bili 21.5, ammonia 91, AST/ALT 91/47


Patient with history of pancreatic duct stent, concern for blockage.  

Ultrasound pending for further evaluation.


Consult gastroenterology, medical oncology - appreciate recommendations


Lactulose for elevated ammonia add rifaximin





2.  Elevated lactic acid


Chest x-ray without infiltrates


CBCmonitoring


follow cultures


Vancomycin/Zosyn until infectious rule out completed


Holding IV fluid hydration secondary to ascites and low albumin





3.  Ascites


Chronic


Last paracentesis on 12/18


Continue Lasix





4.  Hypertension


Continue amlodipine








5.  Coagulopathy severe probably secondary to liver metastases needs palliative 

care consult





Physical therapy and occupational therapy to eval and treat


Poor prognosis





-


FEN


NPO


Electrolytes: monitor and replete prn


SCDs











Bernardino Jansen MD Jan 4, 2018 13:43

## 2018-01-04 NOTE — PD.ONC.PN
Subjective


Subjective


Remarks


Thirsty, wants something to drink.


Hold a cup and straw.


Denies pain.





Objective


Data











  Date Time  Temp Pulse Resp B/P (MAP) Pulse Ox O2 Delivery O2 Flow Rate FiO2


 


1/4/18 07:49 97.0 100 18 146/82 (103) 96   


 


1/4/18 04:00  100      


 


1/4/18 03:30 98.6 100 14 168/88 (114) 96   


 


1/3/18 23:49 97.6 99 12 160/87 (111) 96   


 


1/3/18 20:30 98.7 102 14 165/79 (107) 96   


 


1/3/18 20:30  92      


 


1/3/18 16:39 97.1 101 20 149/76 (100) 97   


 


1/3/18 11:15    147/84 (105)    


 


1/3/18 11:13 97.8 96 18 165/81 (109) 96   














 1/4/18 1/4/18 1/4/18





 07:00 15:00 23:00


 


Intake Total 1462.5 ml  


 


Balance 1462.5 ml  








Result Diagram:  


1/3/18 0355                                                                    

            1/3/18 0355





Laboratory Results





Laboratory Tests








Test


  1/4/18


03:20


 


Vancomycin Level Trough 19.0 MCG/ML 








Culture Results





Microbiology








 Date/Time


Source Procedure


Growth Status


 


 


 1/2/18 03:15


Blood Peripheral Aerobic Blood Culture - Preliminary


NO GROWTH IN 1 DAY Resulted


 


 1/2/18 03:15 Anaerobic Blood Culture - Preliminary


Gram Negative [place] Resulted





 1/2/18 03:10


Blood Peripheral Aerobic Blood Culture - Preliminary


NO GROWTH IN 1 DAY Resulted


 


 1/2/18 03:10 Anaerobic Blood Culture - Preliminary


Enterobacteriaceae Family Resulted











Administered Medications








 Medications


  (Trade)  Dose


 Ordered  Sig/Amilcar


 Route


 PRN Reason  Start Time


 Stop Time Status Last Admin


Dose Admin


 


 Sodium Chloride


  (NS Flush)  2 ml  BID


 IV FLUSH


   1/2/18 09:00


    1/3/18 21:11


 


 


 Lactulose


  (Lactulose Liq)  30 ml  QID


 PO


   1/2/18 09:00


    1/4/18 08:16


 


 


 Piperacillin Sod/


 Tazobactam Sod  50 ml @ 


 100 mls/hr  Q6H


 IV


   1/2/18 11:00


    1/4/18 04:52


 


 


 Amlodipine


 Besylate


  (Norvasc)  5 mg  DAILY


 PO


   1/2/18 09:00


    1/4/18 08:16


 


 


 Furosemide


  (Lasix)  20 mg  DAILY


 PO


   1/2/18 09:00


   Future Hold 1/2/18 09:50


 


 


 Oxycodone HCl


  (Roxicodone)  5 mg  Q6H  PRN


 PO


 PAIN  1/2/18 06:00


    1/2/18 21:23


 


 


 Rifaximin


  (Xifaxan)  550 mg  BID


 PO


   1/2/18 09:00


    1/4/18 08:16


 


 


 Pantoprazole


 Sodium


  (Protonix Inj)  40 mg  Q24H


 IV PUSH


   1/2/18 16:00


    1/3/18 16:34


 


 


 Clonidine


  (Catapres)  0.1 mg  Q4H  PRN


 PO


 SBP > 160  1/2/18 17:30


    1/2/18 18:14


 


 


 Dextrose/Sodium


 Chloride  1,000 ml @ 


 42 mls/hr  [id number]


 IV


   1/2/18 19:15


    1/4/18 00:27


 








Objective Remarks


GENERAL: Cachectic, jaundiced man, bitemporal wasting.


More awake, responsive to questions.  Requesting something to drink.


SKIN: Warm and dry.  Multiple bruising and senile purpura.


HEAD: Normocephalic.


EYES: No scleral icterus. No injection or drainage. 


NECK: Supple, trachea midline. No JVD or lymphadenopathy.


LYMPHATIC: No adenopathy.


CARDIOVASCULAR: Regular rate and rhythm without murmurs. 


RESPIRATORY: Breath sounds equal bilaterally. No accessory muscle use.


GASTROINTESTINAL: Abdomen soft, non-tender, distended with fluid wave no 

guarding.


EXTREMITIES: No cyanosis, or edema. 


MUSCULOSKELETAL: Adequate muscle tone.


NEUROLOGICAL: Awake,  still confused.





Assessment/Plan


Problem List:  


(1) Pancreatic cancer


ICD Codes:  C25.9 - Malignant neoplasm of pancreas, unspecified


Status:  Acute


Plan:  Metastatic pancreatic cancer with presentation jaundice.


Metal stent in place.


Progressed on multiple lines chemotherapy, most recently on Keytruda.





Cancer cachexia.


New jaundice and ascites.


GM - bacteremia.


Confusion, coagulopathy and encephalopathy.





Discussed with wife above finding.


Discussed goal to treat potentially reversible condition as GM neg bacteremia, 

elevated ammonia.


Discussed his liver disease with metastasis and potential for his to worsen, 

even through she hopes with all her heart he improves.


Wife agree to make patient no code, she understand underlying incurable 

malignancy, potential for harm in resuscitation out weigh good.








1/04/18.  Encephalopathy improve, more awake, responsive


Response to Abx therapy.


Consult with GI potentially more stable for ERCP to determine if obstruction of 

metal stent.


Pending decision to proceed with procedure, plan for FFP before.


Trial of fluid, consult with speech therapy.


Await ammonia level.


Defer paracentesis.


Monitor response of coagulopathy to vitamin K.





Assessment


51 y/o man with metastatic pancreatic cancer admitted with jaundice, dx gm neg 

bacteremia.


Plan


1. Cont Abx.


2. No code.


3. Trial oral vitamin K


4. Speech/swallow consult


5. FFP prior to GI procedure











Corinna Alves MD Jan 4, 2018 09:42

## 2018-01-05 VITALS
OXYGEN SATURATION: 97 % | SYSTOLIC BLOOD PRESSURE: 118 MMHG | HEART RATE: 85 BPM | RESPIRATION RATE: 18 BRPM | DIASTOLIC BLOOD PRESSURE: 77 MMHG | TEMPERATURE: 97.5 F

## 2018-01-05 VITALS
HEART RATE: 82 BPM | DIASTOLIC BLOOD PRESSURE: 77 MMHG | OXYGEN SATURATION: 98 % | RESPIRATION RATE: 21 BRPM | TEMPERATURE: 97.6 F | SYSTOLIC BLOOD PRESSURE: 119 MMHG

## 2018-01-05 VITALS
HEART RATE: 89 BPM | DIASTOLIC BLOOD PRESSURE: 87 MMHG | TEMPERATURE: 98 F | OXYGEN SATURATION: 95 % | RESPIRATION RATE: 20 BRPM | SYSTOLIC BLOOD PRESSURE: 137 MMHG

## 2018-01-05 VITALS
HEART RATE: 89 BPM | OXYGEN SATURATION: 96 % | RESPIRATION RATE: 14 BRPM | SYSTOLIC BLOOD PRESSURE: 147 MMHG | DIASTOLIC BLOOD PRESSURE: 86 MMHG | TEMPERATURE: 98 F

## 2018-01-05 VITALS
SYSTOLIC BLOOD PRESSURE: 143 MMHG | RESPIRATION RATE: 18 BRPM | OXYGEN SATURATION: 100 % | HEART RATE: 89 BPM | DIASTOLIC BLOOD PRESSURE: 84 MMHG | TEMPERATURE: 97.6 F

## 2018-01-05 VITALS
RESPIRATION RATE: 18 BRPM | DIASTOLIC BLOOD PRESSURE: 72 MMHG | SYSTOLIC BLOOD PRESSURE: 128 MMHG | HEART RATE: 88 BPM | OXYGEN SATURATION: 99 %

## 2018-01-05 VITALS
HEART RATE: 99 BPM | RESPIRATION RATE: 16 BRPM | SYSTOLIC BLOOD PRESSURE: 143 MMHG | TEMPERATURE: 98 F | OXYGEN SATURATION: 97 % | DIASTOLIC BLOOD PRESSURE: 81 MMHG

## 2018-01-05 VITALS — HEART RATE: 90 BPM

## 2018-01-05 VITALS — HEART RATE: 80 BPM

## 2018-01-05 VITALS — HEART RATE: 81 BPM

## 2018-01-05 LAB
ALBUMIN SERPL-MCNC: 1.9 GM/DL (ref 3.4–5)
ALP SERPL-CCNC: 152 U/L (ref 45–117)
ALT SERPL-CCNC: 71 U/L (ref 12–78)
AST SERPL-CCNC: 120 U/L (ref 15–37)
BASOPHILS # BLD AUTO: 0 TH/MM3 (ref 0–0.2)
BASOPHILS NFR BLD: 0.2 % (ref 0–2)
BILIRUB INDIRECT SERPL-MCNC: 6.7 MG/DL (ref 0–0.8)
BILIRUB SERPL-MCNC: 22.1 MG/DL (ref 0.2–1)
BUN SERPL-MCNC: 43 MG/DL (ref 7–18)
CALCIUM SERPL-MCNC: 7.8 MG/DL (ref 8.5–10.1)
CHLORIDE SERPL-SCNC: 105 MEQ/L (ref 98–107)
CREAT SERPL-MCNC: 1.34 MG/DL (ref 0.6–1.3)
DIRECT BILIRUBIN ADULT: 15.4 MG/DL (ref 0–0.2)
EOSINOPHIL # BLD: 0.3 TH/MM3 (ref 0–0.4)
EOSINOPHIL NFR BLD: 1.8 % (ref 0–4)
ERYTHROCYTE [DISTWIDTH] IN BLOOD BY AUTOMATED COUNT: 18 % (ref 11.6–17.2)
GFR SERPLBLD BASED ON 1.73 SQ M-ARVRAT: 56 ML/MIN (ref 89–?)
GLUCOSE SERPL-MCNC: 138 MG/DL (ref 74–106)
HCO3 BLD-SCNC: 23 MEQ/L (ref 21–32)
HCT VFR BLD CALC: 36.2 % (ref 39–51)
HGB BLD-MCNC: 12.8 GM/DL (ref 13–17)
INR PPP: 3.3 RATIO
LYMPHOCYTES # BLD AUTO: 1.5 TH/MM3 (ref 1–4.8)
LYMPHOCYTES NFR BLD AUTO: 9.6 % (ref 9–44)
LYMPHOCYTES: 11 % (ref 9–44)
MCH RBC QN AUTO: 33.9 PG (ref 27–34)
MCHC RBC AUTO-ENTMCNC: 35.3 % (ref 32–36)
MCV RBC AUTO: 96.2 FL (ref 80–100)
MONOCYTE #: 0.9 TH/MM3 (ref 0–0.9)
MONOCYTES NFR BLD: 6 % (ref 0–8)
MONOCYTES: 9 % (ref 0–8)
NEUTROPHILS # BLD AUTO: 13 TH/MM3 (ref 1.8–7.7)
NEUTROPHILS NFR BLD AUTO: 82.4 % (ref 16–70)
NEUTS BAND # BLD MANUAL: 12.4 TH/MM3 (ref 1.8–7.7)
NEUTS BAND NFR BLD: 5 % (ref 0–6)
NEUTS SEG NFR BLD MANUAL: 74 % (ref 16–70)
NUCLEATED RED BLOOD CELL: 1 (ref 0–0)
OVALOCYTES BLD QL SMEAR: (no result)
PLATELET # BLD: 85 TH/MM3 (ref 150–450)
PMV BLD AUTO: 7.7 FL (ref 7–11)
PROT SERPL-MCNC: 6.7 GM/DL (ref 6.4–8.2)
PROTHROMBIN TIME: 33.5 SEC (ref 9.8–11.6)
RBC # BLD AUTO: 3.76 MIL/MM3 (ref 4.5–5.9)
SODIUM SERPL-SCNC: 138 MEQ/L (ref 136–145)
WBC # BLD AUTO: 15.7 TH/MM3 (ref 4–11)
WBC NRBC COR # BLD: 1 /100 WBC (ref 0–0)

## 2018-01-05 RX ADMIN — VANCOMYCIN HYDROCHLORIDE SCH MLS/HR: 1 INJECTION, SOLUTION INTRAVENOUS at 16:56

## 2018-01-05 RX ADMIN — SODIUM BICARBONATE SCH MLS/HR: 84 INJECTION, SOLUTION INTRAVENOUS at 18:42

## 2018-01-05 RX ADMIN — PANTOPRAZOLE SODIUM SCH MG: 40 INJECTION, POWDER, FOR SOLUTION INTRAVENOUS at 16:33

## 2018-01-05 RX ADMIN — TAZOBACTAM SODIUM AND PIPERACILLIN SODIUM SCH MLS/HR: 375; 3 INJECTION, SOLUTION INTRAVENOUS at 04:13

## 2018-01-05 RX ADMIN — STANDARDIZED SENNA CONCENTRATE AND DOCUSATE SODIUM SCH TAB: 8.6; 5 TABLET, FILM COATED ORAL at 07:59

## 2018-01-05 RX ADMIN — TAZOBACTAM SODIUM AND PIPERACILLIN SODIUM SCH MLS/HR: 375; 3 INJECTION, SOLUTION INTRAVENOUS at 16:33

## 2018-01-05 RX ADMIN — WATER SCH ML: 1 IRRIGANT IRRIGATION at 14:04

## 2018-01-05 RX ADMIN — VANCOMYCIN HYDROCHLORIDE SCH MLS/HR: 1 INJECTION, SOLUTION INTRAVENOUS at 01:12

## 2018-01-05 RX ADMIN — STANDARDIZED SENNA CONCENTRATE AND DOCUSATE SODIUM SCH TAB: 8.6; 5 TABLET, FILM COATED ORAL at 21:20

## 2018-01-05 RX ADMIN — PHYTONADIONE SCH MG: 10 INJECTION, EMULSION INTRAMUSCULAR; INTRAVENOUS; SUBCUTANEOUS at 07:57

## 2018-01-05 RX ADMIN — WATER SCH ML: 1 IRRIGANT IRRIGATION at 07:57

## 2018-01-05 RX ADMIN — RIFAXIMIN SCH MG: 550 TABLET ORAL at 21:20

## 2018-01-05 RX ADMIN — RIFAXIMIN SCH MG: 550 TABLET ORAL at 07:57

## 2018-01-05 RX ADMIN — WATER SCH ML: 1 IRRIGANT IRRIGATION at 21:20

## 2018-01-05 RX ADMIN — Medication SCH ML: at 21:20

## 2018-01-05 RX ADMIN — WATER SCH ML: 1 IRRIGANT IRRIGATION at 16:33

## 2018-01-05 RX ADMIN — SODIUM BICARBONATE SCH MLS/HR: 84 INJECTION, SOLUTION INTRAVENOUS at 01:14

## 2018-01-05 RX ADMIN — Medication SCH ML: at 07:57

## 2018-01-05 RX ADMIN — TAZOBACTAM SODIUM AND PIPERACILLIN SODIUM SCH MLS/HR: 375; 3 INJECTION, SOLUTION INTRAVENOUS at 23:06

## 2018-01-05 RX ADMIN — TAZOBACTAM SODIUM AND PIPERACILLIN SODIUM SCH MLS/HR: 375; 3 INJECTION, SOLUTION INTRAVENOUS at 14:04
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
## 2018-01-05 NOTE — RADRPT
EXAM DATE/TIME:  01/05/2018 20:33 

 

[facility] COMPARISON:     

CT ABDOMEN & PELVIS W/O CONTRAST, November 10, 2017, 14:16.

 

 

INDICATIONS :     

Jaundice; history of pancreatic cancer.

                      

 

IV CONTRAST:     

97 cc Omnipaque 350 (iohexol) IV 

 

 

ORAL CONTRAST:      

Prescribed oral contrast ingested.

                      

 

RADIATION DOSE:     

14.79 CTDIvol (mGy) 

 

 

MEDICAL HISTORY :     

Carcinoma, pancreas. Hypertension. 

 

SURGICAL HISTORY :       

biliary duct stent

 

ENCOUNTER:      

Subsequent

 

ACUITY:      

4 - 6 days

 

PAIN SCALE:      

6/10

 

LOCATION:         

abdomen

 

TECHNIQUE:     

Volumetric scanning of the abdomen and pelvis was performed.  Using automated exposure control and ad
justment of the mA and/or kV according to patient size, radiation dose was kept as low as reasonably 
achievable to obtain optimal diagnostic quality images.  DICOM format image data is available electro
nically for review and comparison.  

 

FINDINGS:     

There are innumerable metastatic lesions in the liver that have progressed since prior examinations. 
No acute findings in the spleen or kidneys or adrenals. Biliary stent present with dilatation of the 
bile duct. There is severe ascites in the abdomen and pelvis. No bowel obstruction. There is a mild i
leus. Arguelles catheter in the bladder.

 

There is an ill-defined mass in the pancreatic head measuring at least 3.5 cm in diameter dilatation 
of the pancreatic duct.

 

There is a moderate-sized right pleural effusion with compressive atelectasis. Mild left basilar atel
ectasis. A hiatal hernia is present. Bony metastatic disease is present with sclerotic lesions in the
 lower thoracic spine, lumbar spine proximal femora, larger on the right similar to prior examination
 in November. There is mild anasarca.

 

CONCLUSION:     

1. Marked progression in metastatic disease with innumerable hepatic metastases and severe ascites. P
robable small peritoneal implants around the liver.

2. There is a biliary stent present with bile duct dilated to 2 cm.

3. 3.5 cm ill-defined pancreatic mass with pancreatic ductal dilatation distal to the mass.

4. Numerous bony metastases without evidence for acute fracture.

 

 

 

 Leno Rose MD on January 05, 2018 at 21:05           

Board Certified Radiologist.

 This report was verified electronically.

## 2018-01-05 NOTE — HHI.GIFU
Subjective


Remarks


eyes open randomly


When talking to him, turns head away


redraw labs to check creatinine., BUN


afebrile





Objective


Vitals I&O





Vital Signs








  Date Time  Temp Pulse Resp B/P (MAP) Pulse Ox O2 Delivery O2 Flow Rate FiO2


 


1/5/18 07:59 97.6 89 18 143/84 (103) 100   


 


1/5/18 04:16 98.0 99 16 143/81 (101) 97   


 


1/5/18 01:00 98.0 89 14 147/86 (106) 96   


 


1/4/18 21:00 98.0 102 16 165/79 (107) 96   


 


1/4/18 21:00  87      


 


1/4/18 15:07  86 15 145/80 (101) 96   


 


1/4/18 15:00  84      


 


1/4/18 11:27  103 18 143/79 (100) 93   














I/O      


 


 1/4/18 1/4/18 1/4/18 1/5/18 1/5/18 1/5/18





 06:59 14:59 22:59 06:59 14:59 22:59


 


Intake Total 1462.5 ml 50 ml 1680 ml 480 ml  


 


Balance 1462.5 ml 50 ml 1680 ml 480 ml  


 


      


 


Intake Oral 100 ml  1680 ml 480 ml  


 


IV Total 1362.5 ml 50 ml    


 


# Voids 3  2 2  


 


# Bowel Movements 3  2 2  








Laboratory





Laboratory Tests








Test


  1/5/18


07:51


 


Prothrombin Time 33.5 


 


Prothromb Time International


Ratio 3.3 


 


 


Activated Partial


Thromboplast Time 58.4 


 














 Date/Time


Source Procedure


Growth Status


 


 


 1/2/18 03:15


Blood Peripheral Aerobic Blood Culture - Preliminary


NO GROWTH IN 2 DAYS Resulted


 


 1/2/18 03:15 Anaerobic Blood Culture - Preliminary


Gram Negative [place] Resulted








Imaging





Last Impressions








Gall Bladder Ultrasound 1/2/18 0896 Signed





Impressions: 





 Service Date/Time:  Tuesday, January 2, 2018 07:54 - CONCLUSION:  1. There is 





 diffuse abdominal ascites. 2. The liver appears quite small and heterogeneous 

in 





 echotexture consistent with cirrhosis. The patient has known pancreatic cancer 





 with several small hepatic lesions. These were not seen by the ultrasound. I 

do 





 not see evidence of intrahepatic biliary ductal dilation. The patient has a 





 known biliary stent in place. 3. There is minimal sludge in the dependent 





 portion the gallbladder. There is gallbladder wall thickening however, this is 





 not an unexpected finding with the presence of ascites.     Giorgi Soto MD 


 


Head CT 1/2/18 0301 Signed





Impressions: 





 Service Date/Time:  Tuesday, January 2, 2018 03:23 - CONCLUSION:  No acute 





 disease.       Clement Torres Jr., MD 


 


Chest X-Ray 1/2/18 0301 Signed





Impressions: 





 Service Date/Time:  Tuesday, January 2, 2018 03:16 - CONCLUSION:  Blunting of 





 the right costophrenic angle I either related to a small effusion or pleural 





 scarring. Otherwise, no infiltrates.     Clement Torres Jr., MD 








Physical Exam


HEENT: Normocephalic, sclera dry eyes are glassy, icteric


NECK: Neck thin


CHEST:  Low volumes


CARDIAC:  Regular rate 


ABDOMEN:  Abdomen round, taut, minimal bowel sounds, tympanic alternating with 

some dull sounds on palpation


EXTREMITIES: No edema.


SKIN: Thin dry turgor, icteric


CNS:  Randomly opens eyes but nonverbal





Assessment and Plan


Assessment:  


(1) Jaundice


ICD Codes:  R17 - Unspecified jaundice


Status:  Acute


Plan


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 

MR I testing.  Spoke to Dr. Marcos for patient's plan of care.  Due to the 

probable biliary obstructions, pancreatic mass with metastasis, patient is not 

a candidate for ERCP nor any other type of GI procedure workup. 


 Initially  Attempted MRCP when patient was first admitted, but he was unable 

to tolerate lying on table and holding still even with Ativan.  May need mild 

sedation before going down for procedure.  No contrast to be used due if 

patient has creat >2. Labs being redrawn today to check before MRCP


According to the record paracentesis, was not completed on this patient and was 

canceled today. 





Now the patient is DO NOT RESUSCITATE , Diet can be what ever he tolerates or 

desires.  At this point patient is not asking for any food to eat. 





Palliative care, appreciate input, patient is critically ill and appears to be 

actively dying


GI will sign off


PPI by mouth if patient can swallow





Plan of care will be based on symptom management and toleration of any further 

testing.





Patient was examined by myself , discussed with Dr. Marcos, note was done on 

his behalf











Aliya Zheng Jan 5, 2018 09:21

## 2018-01-06 VITALS
TEMPERATURE: 97.3 F | OXYGEN SATURATION: 95 % | DIASTOLIC BLOOD PRESSURE: 75 MMHG | SYSTOLIC BLOOD PRESSURE: 129 MMHG | RESPIRATION RATE: 18 BRPM | HEART RATE: 82 BPM

## 2018-01-06 VITALS
SYSTOLIC BLOOD PRESSURE: 122 MMHG | HEART RATE: 78 BPM | DIASTOLIC BLOOD PRESSURE: 72 MMHG | OXYGEN SATURATION: 97 % | RESPIRATION RATE: 20 BRPM

## 2018-01-06 VITALS
HEART RATE: 79 BPM | OXYGEN SATURATION: 96 % | SYSTOLIC BLOOD PRESSURE: 105 MMHG | DIASTOLIC BLOOD PRESSURE: 67 MMHG | RESPIRATION RATE: 20 BRPM

## 2018-01-06 VITALS
OXYGEN SATURATION: 96 % | HEART RATE: 84 BPM | RESPIRATION RATE: 20 BRPM | DIASTOLIC BLOOD PRESSURE: 78 MMHG | SYSTOLIC BLOOD PRESSURE: 124 MMHG

## 2018-01-06 VITALS — HEART RATE: 80 BPM

## 2018-01-06 RX ADMIN — TAZOBACTAM SODIUM AND PIPERACILLIN SODIUM SCH MLS/HR: 375; 3 INJECTION, SOLUTION INTRAVENOUS at 04:34

## 2018-01-06 RX ADMIN — VANCOMYCIN HYDROCHLORIDE SCH MLS/HR: 1 INJECTION, SOLUTION INTRAVENOUS at 12:00

## 2018-01-06 RX ADMIN — WATER SCH ML: 1 IRRIGANT IRRIGATION at 13:12

## 2018-01-06 RX ADMIN — PHYTONADIONE SCH MG: 10 INJECTION, EMULSION INTRAMUSCULAR; INTRAVENOUS; SUBCUTANEOUS at 09:05

## 2018-01-06 RX ADMIN — WATER SCH ML: 1 IRRIGANT IRRIGATION at 09:05

## 2018-01-06 RX ADMIN — Medication SCH ML: at 09:00

## 2018-01-06 RX ADMIN — PANTOPRAZOLE SODIUM SCH MG: 40 INJECTION, POWDER, FOR SOLUTION INTRAVENOUS at 16:31

## 2018-01-06 RX ADMIN — SODIUM BICARBONATE SCH MLS/HR: 84 INJECTION, SOLUTION INTRAVENOUS at 04:34

## 2018-01-06 RX ADMIN — TAZOBACTAM SODIUM AND PIPERACILLIN SODIUM SCH MLS/HR: 375; 3 INJECTION, SOLUTION INTRAVENOUS at 12:23

## 2018-01-06 RX ADMIN — RIFAXIMIN SCH MG: 550 TABLET ORAL at 09:05

## 2018-01-06 RX ADMIN — STANDARDIZED SENNA CONCENTRATE AND DOCUSATE SODIUM SCH TAB: 8.6; 5 TABLET, FILM COATED ORAL at 09:05

## 2018-01-06 NOTE — HHI.DS
__________________________________________________





Discharge Summary


Admission Date


Jan 2, 2018 at 05:17


Discharge Date:  Jan 6, 2018


Admitting Diagnosis





jaundice.  Hepatic encephalopathy.  History of pancreatic cancer.





(1) Primary malignant neoplasm of pancreas with metastasis to other site


ICD Code:  C25.9 - Malignant neoplasm of pancreas, unspecified


Status:  Acute


Procedures


none


Brief History - From Admission


52-year-old male with a past medical history significant for pancreatic cancer 

and hypertension presents to the emergency department with altered mental 

status that began yesterday.  The patient's wife reports that he has had 

intermittent coughing and vomiting for the past 2 days.  She reports that 

yesterday he became altered describing him as confused and disoriented.  The 

patient did not know where he was when he was in his own home.  She also 

reports noticing increased scleral icterus and jaundice that began on 12/30.  

Vision is currently undergoing chemotherapy.  His oncologist is Dr. Alves.


On arrival to the emergency department the patient was tachycardic at 124.  He 

was afebrile at 97.9.  /100.  Pulse ox 95% on room air.  Lab values 

significant for a total bilirubin of 21.5 (was 1.8 on 12/20).  Ammonia 91.  

Lactic acid 6.3.  CBC pending.  Head CT negative for acute intracranial 

process.  The patient is altered, A&O 0.


CBC/BMP:  


1/5/18 0940                                                                    

            1/5/18 0940





Significant Findings





Laboratory Tests








Test


  1/4/18


03:20 1/5/18


07:51 1/5/18


09:40


 


Vancomycin Level Trough


  19.0 MCG/ML


(5.0-10.0) 


  


 


 


Prothrombin Time


  


  33.5 SEC


(9.8-11.6) 


 


 


Activated Partial


Thromboplast Time 


  58.4 SEC


(24.3-30.1) 


 


 


White Blood Count


  


  


  15.7 TH/MM3


(4.0-11.0)


 


Red Blood Count


  


  


  3.76 MIL/MM3


(4.50-5.90)


 


Hemoglobin


  


  


  12.8 GM/DL


(13.0-17.0)


 


Hematocrit


  


  


  36.2 %


(39.0-51.0)


 


Red Cell Distribution Width


  


  


  18.0 %


(11.6-17.2)


 


Platelet Count


  


  


  85 TH/MM3


(150-450)


 


Neutrophils (%) (Auto)


  


  


  82.4 %


(16.0-70.0)


 


Neutrophils # (Auto)


  


  


  13.0 TH/MM3


(1.8-7.7)


 


Neutrophils % (Manual)   74 % (16-70) 


 


Monocytes %   9 % (0-8) 


 


Neutrophils # (Manual)


  


  


  12.4 TH/MM3


(1.8-7.7)


 


Nucleated Red Blood Cells


  


  


  1 /100 WBC


(0-0)


 


Platelet Estimate   LOW (NORMAL) 


 


Ovalocytes   1+ (NORMAL) 


 


Blood Urea Nitrogen


  


  


  43 MG/DL


(7-18)


 


Creatinine


  


  


  1.34 MG/DL


(0.60-1.30)


 


Random Glucose


  


  


  138 MG/DL


()


 


Albumin


  


  


  1.9 GM/DL


(3.4-5.0)


 


Calcium Level


  


  


  7.8 MG/DL


(8.5-10.1)


 


Alkaline Phosphatase


  


  


  152 U/L


()


 


Aspartate Amino Transf


(AST/SGOT) 


  


  120 U/L


(15-37)


 


Total Bilirubin


  


  


  22.1 MG/DL


(0.2-1.0)


 


Direct Bilirubin


  


  


  15.4 MG/DL


(0.0-0.2)


 


Potassium Level


  


  


  3.0 MEQ/L


(3.5-5.1)


 


Estimat Glomerular Filtration


Rate 


  


  56 ML/MIN


(>89)


 


Indirect Bilirubin


  


  


  6.7 MG/DL


(0.0-0.8)








PE at Discharge


GENERAL: Very altered not able to answer questions appropriately very jaundiced 

yellow very confused not appropriate


SKIN: Warm and dry.  Severe jaundice


HEAD: Atraumatic. Normocephalic. 


EYES: Pupils equal and round.  Severe scleral icterus. No injection or 

drainage. 


ENT: No nasal bleeding or discharge.  Mucous membranes pink and moist.  Tongue 

is midline


NECK: Trachea midline. No JVD.  Supple


CARDIOVASCULAR: Regular rate and rhythm.  S1 and S2 no S3 or S4


RESPIRATORY: No accessory muscle use.  Few rhonchi. Breath sounds equal 

bilaterally. 


GASTROINTESTINAL: Abdomen soft, non-tender. Hepatic and splenic margins not 

palpable.  Positive Distended positive ascites


MUSCULOSKELETAL: Extremities without clubbing, cyanosis, or edema. No obvious 

deformities. 


NEUROLOGICAL: Awake and alert. No obvious cranial nerve deficits.  Motor 

grossly within normal limits. 4 out of 5 muscle strength in the arms and legs.  

ABNormal speech.


PSYCHIATRIC: INAppropriate mood and affect; insight and judgment ABnormal.  

Very confused not


Hospital Course


52M with pancreatic cancer who was admitted with altered mental status.  His 

condition has improved slightly due to reduction of his ammonia level.  In 

addition to chronically elevated ammonia he also has a baseline INR of 3.4.  

Overall he is nearing the end of his life and his wife has come to terms with 

that.  They have elected for hospice care center and a discharge has been 

requested.


Pt Condition on Discharge:  Deteriorating


Discharge Disposition:  Hospice/Med Facility


Discharge Time:  <= 30 minutes


Discharge Instructions


DIET: Follow Instructions for:  As Tolerated, No Restrictions


Activities you can perform:  Continue Bedrest











Juice Schneider MD Jan 6, 2018 15:51

## 2018-01-06 NOTE — PD.ONC.PN
Subjective


Subjective


Remarks


Afebrile overnight. 


Patient confused, lethargic. 


I called and spoke with wife, she is on her way to the hospital.





Objective


Data











  Date Time  Temp Pulse Resp B/P (MAP) Pulse Ox O2 Delivery O2 Flow Rate FiO2


 


1/6/18 08:47  84 20 124/78 (93) 96   


 


1/6/18 04:40 97.3 82 18 129/75 (93) 95   


 


1/6/18 04:00  80      


 


1/5/18 23:09  81      


 


1/5/18 23:04 97.6 82 21 119/77 (91) 98   


 


1/5/18 21:14 97.5 85 18 118/77 (91) 97   


 


1/5/18 20:15  80      


 


1/5/18 16:48  88 18 128/72 (90) 99   


 


1/5/18 12:00 98.0 89 20 137/87 (104) 95   














 1/6/18 1/6/18 1/6/18





 07:00 15:00 23:00


 


Intake Total 600 ml  


 


Output Total 153 ml  


 


Balance 447 ml  








Result Diagram:  


1/5/18 0940                                                                    

            1/5/18 0940








Administered Medications








 Medications


  (Trade)  Dose


 Ordered  Sig/Amilcar


 Route


 PRN Reason  Start Time


 Stop Time Status Last Admin


Dose Admin


 


 Sodium Chloride


  (NS Flush)  2 ml  BID


 IV FLUSH


   1/2/18 09:00


    1/5/18 21:20


 


 


 Senna/Docusate


 Sodium


  (Jackie-Colace)  1 tab  BID


 PO


   1/2/18 09:00


    1/6/18 09:05


 


 


 Lactulose


  (Lactulose Liq)  30 ml  QID


 PO


   1/2/18 09:00


    1/6/18 09:05


 


 


 Piperacillin Sod/


 Tazobactam Sod  50 ml @ 


 100 mls/hr  Q6H


 IV


   1/2/18 11:00


    1/6/18 04:34


 


 


 Amlodipine


 Besylate


  (Norvasc)  5 mg  DAILY


 PO


   1/2/18 09:00


    1/6/18 09:05


 


 


 Furosemide


  (Lasix)  20 mg  DAILY


 PO


   1/2/18 09:00


   Future Hold 1/2/18 09:50


 


 


 Oxycodone HCl


  (Roxicodone)  5 mg  Q6H  PRN


 PO


 PAIN  1/2/18 06:00


    1/5/18 16:55


 


 


 Rifaximin


  (Xifaxan)  550 mg  BID


 PO


   1/2/18 09:00


    1/6/18 09:05


 


 


 Pantoprazole


 Sodium


  (Protonix Inj)  40 mg  Q24H


 IV PUSH


   1/2/18 16:00


    1/5/18 16:33


 


 


 Clonidine


  (Catapres)  0.1 mg  Q4H  PRN


 PO


 SBP > 160  1/2/18 17:30


    1/2/18 18:14


 


 


 Dextrose/Sodium


 Chloride  1,000 ml @ 


 42 mls/hr  [id number]


 IV


   1/2/18 19:15


    1/6/18 04:34


 


 


 Vancomycin HCl


 1000 mg/Sodium


 Chloride  250 ml @ 


 250 mls/hr  Q18H


 IV


   1/5/18 00:00


    1/5/18 16:56


 


 


 Phytonadione


  (Mephyton Liq)  5 mg  DAILY


 PO


   1/4/18 09:45


 1/7/18 09:44  1/6/18 09:05


 








Objective Remarks


GENERAL: severely malnourished male, lying in bed. 


SKIN: Warm and dry. jaundiced. 


HEAD: Normocephalic.


EYES: ++ scleral icterus. No injection or drainage. 


NECK: Supple, trachea midline. 


CARDIOVASCULAR: +S1/S2


RESPIRATORY: anterior fields with rhonchi.


GASTROINTESTINAL: Abdomen distended with ascites.  


EXTREMITIES: No cyanosis


MUSCULOSKELETAL: severely deconditioned, muscle wasting throughout. 


NEUROLOGICAL: lethargic, confused





Assessment/Plan


Problem List:  


(1) Pancreatic cancer


ICD Codes:  C25.9 - Malignant neoplasm of pancreas, unspecified


Status:  Acute


Plan:  Metastatic pancreatic cancer with presentation jaundice.


Metal stent in place.


Progressed on multiple lines chemotherapy, most recently on Keytruda.





Cancer cachexia.


New jaundice and ascites.


GM - bacteremia.


Confusion, coagulopathy and encephalopathy.





Discussed with wife above finding.


Discussed goal to treat potentially reversible condition as GM neg bacteremia, 

elevated ammonia.


Discussed his liver disease with metastasis and potential for his to worsen, 

even through she hopes with all her heart he improves.


Wife agree to make patient no code, she understand underlying incurable 

malignancy, potential for harm in resuscitation out weigh good.








1/04/18.  Encephalopathy improve, more awake, responsive


Response to Abx therapy.


Consult with GI potentially more stable for ERCP to determine if obstruction of 

metal stent.


Pending decision to proceed with procedure, plan for FFP before.


Trial of fluid, consult with speech therapy.


Await ammonia level.


Defer paracentesis.


Monitor response of coagulopathy to vitamin K.








1/5/18:


remains confused. 


unable to have MRCP


--monitor ammonia level. 








1/6/18: patient continues to deteriorate. will discuss results of CT with wife/

decision maker when she arrives, patient is hospice appropriate. 





Assessment


53 y/o man with metastatic pancreatic cancer admitted with jaundice, dx gm neg 

bacteremia.


Plan


1. continue supportive care. 


2. plan to discuss CT results with wife when she arrives. patient is hospice 

appropriate.











Annmarie Rayo Jan 6, 2018 11:46

## 2022-12-15 NOTE — RADRPT
EXAM DATE/TIME:  12/18/2017 10:23 

 

[facility] COMPARISON:     

No previous studies available for comparison.

 

 

INDICATIONS :      

Ascites. 

                     

                     

 

 

 

MEDICAL HISTORY :     

Carcinoma, pancreas. Carcinoma, bone.  Carinoma, liver. HTN. Ascites.

 

SURGICAL HISTORY :        

Port placement. Biliary duct stent placement. ERCP. Percutaneous drain. 

 

ENCOUNTER:     

Initial

 

ACUITY:     

1 week

 

PAIN SCORE:     

5/10

 

LOCATION:     

Left lower quadrant

                     

 

FLUID:

Total volume of 3,350 cc of clear, yellow fluid was removed.

Fluid was discarded. Paracentesis was therapeutic only. 

Post procedure scanning reveals no hematoma or other complication.

 

 

TECHNIQUE:  

1.  Ultrasound guidance for abdominal paracentesis.

2.  Paracentesis.

 

The risks, benefits, and alternatives to ultrasound guided paracentesis were explained to the patient
 in detail including the risk of bleeding and infection.  Written and verbal informed consent was obt
ained.  

 

With the patient on the ultrasound table, ultrasound imaging was used to select the most appropriate 
approach for paracentesis.  Overlying skin was prepped and draped in the usual sterile fashion and wi
th a local anesthetic, a dermatotomy was made with an 11 blade scalpel.  A 6 [id number] ca
theter was introduced into the peritoneal cavity and fluid was collected.   

 

The patient tolerated the procedure well and left the ultrasound suite in stable condition. 

 

CONCLUSION:     

Uncomplicated ultrasound guided paracentesis.  

 

 

 

 Clement Torres Jr., MD on December 18, 2017 at 12:12           

Board Certified Radiologist.

 This report was verified electronically. Initial (On Arrival)